# Patient Record
Sex: FEMALE | Race: WHITE
[De-identification: names, ages, dates, MRNs, and addresses within clinical notes are randomized per-mention and may not be internally consistent; named-entity substitution may affect disease eponyms.]

---

## 2017-01-27 NOTE — ER DOCUMENT REPORT
ED GI/





- General


Chief Complaint: Abdominal Pain


Stated Complaint: ABDOMINAL PAIN


Mode of Arrival: Medic


Information source: Patient


Notes: 


Patient is a 21-year-old  female with a history of kidney stones who 

presents to the ER today for right-sided abdominal pain in the upper abdomen 

that woke her up from sleep suddenly tonight.  Patient states that it is severe 

pain and that she has never had this before.  She states it radiates around to 

her right upper back as well but nowhere else.  She admits to some slight 

nausea but no vomiting.  She states this does not feel like her previous kidney 

stones.  She does have her gallbladder and has no history of gallbladder or 

liver issues.


TRAVEL OUTSIDE OF THE [place] IN LAST 30 DAYS: No





- Related Data


Allergies/Adverse Reactions: 


 





No Known Allergies Allergy (Verified 16 17:25)


 











Past Medical History





- General


Information source: Patient





- Social History


Smoking Status: Current Every Day Smoker


Frequency of alcohol use: Occasional


Drug Abuse: None


Family History: Reviewed & Not Pertinent


Renal/ Medical History: Denies: Hx Peritoneal Dialysis


Psychiatric Medical History: Reports: Hx Anxiety, Hx Depression


Past Surgical History: Reports: Hx  Section, Hx Tonsillectomy





- Immunizations


Hx Diphtheria, Pertussis, Tetanus Vaccination: Yes





Review of Systems





- Review of Systems


Constitutional: No symptoms reported


EENT: No symptoms reported


Cardiovascular: No symptoms reported


Respiratory: No symptoms reported


Gastrointestinal: See HPI


Genitourinary: See HPI


Female Genitourinary: No symptoms reported


Musculoskeletal: No symptoms reported


Skin: No symptoms reported


Hematologic/Lymphatic: No symptoms reported


Neurological/Psychological: No symptoms reported





Physical Exam





- Vital signs


Vitals: 





 











Temp Pulse Resp BP Pulse Ox


 


 98.1 F   94   18   119/72   99 


 


 17 00:35  17 00:35  17 00:35  17 00:35  17 00:35














- Notes


Notes: 


PHYSICAL EXAMINATION: 


GENERAL: obviously in pain, crying and can't sit still, in mild acute distress. 


HEAD: Atraumatic, normocephalic. 


EYES: Pupils equal round and reactive to light, extraocular movements intact, 

sclera anicteric, conjunctiva are normal. 


NECK: Normal range of motion, supple without lymphadenopathy 


LUNGS: CTAB and equal. No wheezes rales or rhonchi. 


HEART: Regular rate and rhythm without murmurs


ABDOMEN: Soft,moderate RUQ and right sided tenderness. No guarding, no rebound 


BACK: no vertebral tenderness, normal ROM


GI/: no CVA tenderness


EXTREMITIES: Normal range of motion, no pitting edema. No cyanosis. 


NEUROLOGICAL: Cranial nerves grossly intact. Normal sensory/motor exams. 


PSYCH: Normal mood, normal affect. 


SKIN: Warm, Dry, normal turgor, no rashes or lesions noted 

















Course





- Re-evaluation


Re-evalutation: 





17 03:12


Pt has an elevated WBC at 17.2 and elevated liver enzymes, but RUQ ultrasound 

negative for any acute pathology, normal gallbladder and liver. Pt feels better 

and is sitting up smiling and playing cards with her boyfriend after pain 

medication, IV fluids. hepatitis panel sent, pending. Urinalysis negative for 

blood but does show some evidence of infection with WBC of greater than 10. I 

will treat her for infection with cephalexin and she promises to call GI first 

thing in the morning for follow up. I have provided her with the information. 





- Vital Signs


Vital signs: 





 











Temp Pulse Resp BP Pulse Ox


 


 98.1 F   94   18   119/72   99 


 


 17 00:35  17 00:35  17 00:35  17 00:35  17 00:35














- Laboratory


Result Diagrams: 


 17 00:55





 17 00:55


Laboratory results interpreted by me: 





 











  17





  00:55 00:55 02:20


 


WBC  17.2 H  


 


Absolute Neutrophils  12.4 H  


 


AST   85 H 


 


ALT   75 H 


 


Urine Protein    30 H


 


Urine Ketones    TRACE H


 


Urine Ascorbic Acid    40 H














Discharge





- Discharge


Clinical Impression: 


 RUQ pain, Elevated liver enzymes





UTI (urinary tract infection)


Qualifiers:


 Urinary tract infection type: site unspecified Hematuria presence: without 

hematuria Qualified Code(s): N39.0 - Urinary tract infection, site not specified





Condition: Stable


Disposition: HOME, SELF-CARE


Instructions:  Urinary Tract Infection ([facility]), Cephalexin ([facility]), Gallbladder 

Disease ([facility]), Low-Fat Diet ([facility])


Additional Instructions: 


Return immediately for any new or worsening symptoms.





Follow up with GI, call tomorrow to make followup appointment.





Prescriptions: 


Cephalexin [Cephalexin 500 MG Capsule] 1 cap PO BID #14 cap


Oxycodone HCl 5 mg PO Q4 #15 tablet


Referrals: 


CHRIS GONZALES MD [Primary Care Provider] - Follow up as needed


TIMOTEO VILLAR MD [ACTIVE STAFF] - Follow up as needed

## 2017-02-03 NOTE — ER DOCUMENT REPORT
ED General





- General


Chief Complaint: Abdominal Pain


Stated Complaint: ABDOMINAL PAIN


Mode of Arrival: Medic


Information source: Patient


Notes: 


21-year-old female presents with complaints of right upper quadrant abdominal 

pain nausea vomiting that started all of a sudden today.  Patient denies any 

fevers or chills, patient notes similar symptoms about 2 weeks ago, noted 

elevated liver enzymes but otherwise normal workup.  Patient has follow-up with 

GI next month


TRAVEL OUTSIDE OF THE [place] IN LAST 30 DAYS: No





- HPI


Onset: Just prior to arrival


Onset/Duration: Sudden


Quality of pain: Sharp


Severity: Mild


Pain Level: 2


Associated symptoms: Nausea, Vomiting


Exacerbated by: Denies


Relieved by: Denies


Similar symptoms previously: Yes


Recently seen / treated by doctor: Yes





- Related Data


Allergies/Adverse Reactions: 


 





No Known Allergies Allergy (Verified 16 17:25)


 











Past Medical History





- Social History


Smoking Status: Never Smoker


Cigarette use (# per day): No


Chew tobacco use (# tins/day): No


Smoking Education Provided: No


Family History: Reviewed & Not Pertinent


Renal/ Medical History: Denies: Hx Peritoneal Dialysis


Psychiatric Medical History: Reports: Hx Anxiety, Hx Depression


Past Surgical History: Reports: Hx  Section, Hx Tonsillectomy





- Immunizations


Hx Diphtheria, Pertussis, Tetanus Vaccination: Yes





Review of Systems





- Review of Systems


Notes: 


REVIEW OF SYSTEMS:


CONSTITUTIONAL :  Denies fever,  chills, or sweats.  Denies recent illness.


EENT:   Denies eye, ear, throat, or mouth pain or symptoms.  Denies nasal or 

sinus congestion or discharge.  Denies throat, tongue, or mouth swelling or 

difficulty swallowing.


CARDIOVASCULAR:  Denies chest pain.  Denies palpitations or racing or irregular 

heart beat.  Denies ankle edema.


RESPIRATORY:  Denies cough, cold, or chest congestion.  Denies shortness of 

breath, difficulty breathing, or wheezing.


GASTROINTESTINAL: Abdominal pain nausea vomiting


GENITOURINARY:  Denies difficulty urinating, painful urination, burning, 

frequency, blood in urine, or discharge.


FEMALE  GENITOURINARY:  Denies vaginal bleeding, heavy or abnormal periods, 

irregular periods.  Denies vaginal discharge or odor. 


MUSCULOSKELETAL:  Denies back or neck pain or stiffness.  Denies joint pain or 

swelling.


SKIN:   Denies rash, lesions or sores.


HEMATOLOGIC :   Denies easy bruising or bleeding.


LYMPHATIC:  Denies swollen, enlarged glands.


NEUROLOGICAL:  Denies confusion or altered mental status.  Denies passing out 

or loss of consciousness.  Denies dizziness or lightheadedness.  Denies 

headache.  Denies weakness or paralysis or loss of use of either side.  Denies 

problems with gait or speech.  Denies sensory loss, numbness, or tingling.  

Denies seizures.


PSYCHIATRIC:  Denies anxiety or stress.  Denies depression, suicidal ideation, 

or homicidal ideation.





ALL OTHER SYSTEMS REVIEWED AND NEGATIVE.








Dictation was performed using Dragon voice recognition software 











PHYSICAL EXAMINATION:





GENERAL: Well-appearing, well-nourished and in no acute distress.





HEAD: Atraumatic, normocephalic.





EYES: Pupils equal round and reactive to light, extraocular movements intact, 

conjunctiva are normal.





ENT: Nares patent, oropharynx clear without exudates.  Moist mucous membranes.





NECK: Normal range of motion, supple without lymphadenopathy





LUNGS: Breath sounds clear to auscultation bilaterally and equal.  No wheezes 

rales or rhonchi.





HEART: Regular rate and rhythm without murmurs





ABDOMEN: Soft, mild tenderness right upper quadrant no rebound or guarding





Female : deferred





Musculoskeletal: Normal range of motion, no pitting or edema.  No cyanosis.





NEUROLOGICAL: Cranial nerves grossly intact.  Normal speech, normal gait.  

Normal sensory, motor exams





PSYCH: Normal mood, normal affect.





SKIN: Warm, Dry, normal turgor, no rashes or lesions noted.





Course





- Re-evaluation


Re-evalutation: 


17 10:20


Patient had recent negative ultrasounds, I will evaluate her with a CT lab work





17 14:06


pts lab work notes improvement of WBC count and  liver enzymes [facility] 





17 14:38


CT noted no acute abnormality, i will dc home to follow up with GI





After performing a Medical Screening Examination, I estimate there is LOW risk 

for ACUTE APPENDICITIS, BOWEL OBSTRUCTION, ACUTE CHOLECYSTITIS, PERFORATED 

DIVERTICULITIS, INCARCERATED HERNIA, PANCREATITIS, PELVIC INFLAMMATORY DISEASE, 

PERFORATED ULCER, ECTOPIC PREGNANCY, or TUBO-OVARIAN ABSCESS, thus I consider 

the discharge disposition reasonable. Also, there is no evidence or peritonitis

, sepsis, or toxicity. The patient and I have discussed the diagnosis and risks

, and we agree with discharging home with close follow-up with the 

understanding that symptoms and presentations can change. We also discussed 

returning to the Emergency Department immediately if new or worsening symptoms 

occur. We have discussed the symptoms which are most concerning (e.g., bloody 

stool, fever, changing or worsening pain, vomiting) that necessitate immediate 

return.





- Laboratory


Result Diagrams: 


 17 10:25





 17 10:25


Laboratory results interpreted by me: 


 











  17





  10:25


 


WBC  12.8 H


 


Absolute Monocytes  1.5 H














- Diagnostic Test


Radiology reviewed: Image reviewed, Reports reviewed





Discharge





- Discharge


Clinical Impression: 


Abdominal pain


Qualifiers:


 Abdominal location: generalized Qualified Code(s): R10.84 - Generalized 

abdominal pain





Condition: Stable


Disposition: HOME, SELF-CARE


Instructions:  Abdominal Pain ([facility])


Additional Instructions: 


Please continue follow-up with GI specialist or return immediately if there are 

any other concerns


Prescriptions: 


Famotidine [Pepcid 20 mg Tablet] 20 mg PO DAILY #30 tablet


Hydrocodone/Acetaminophen [Norco 5-325 mg Tablet] 1 tab PO Q6 #10 tablet

## 2017-04-11 ENCOUNTER — HOSPITAL ENCOUNTER (EMERGENCY)
Dept: HOSPITAL 62 - ER | Age: 22
LOS: 1 days | Discharge: HOME | End: 2017-04-12
Payer: MEDICAID

## 2017-04-11 DIAGNOSIS — R10.13: ICD-10-CM

## 2017-04-11 DIAGNOSIS — K29.00: Primary | ICD-10-CM

## 2017-04-11 DIAGNOSIS — F17.200: ICD-10-CM

## 2017-04-11 DIAGNOSIS — R10.11: ICD-10-CM

## 2017-04-11 PROCEDURE — 81025 URINE PREGNANCY TEST: CPT

## 2017-04-11 PROCEDURE — 81001 URINALYSIS AUTO W/SCOPE: CPT

## 2017-04-11 PROCEDURE — 83690 ASSAY OF LIPASE: CPT

## 2017-04-11 PROCEDURE — 99284 EMERGENCY DEPT VISIT MOD MDM: CPT

## 2017-04-11 PROCEDURE — 36415 COLL VENOUS BLD VENIPUNCTURE: CPT

## 2017-04-11 PROCEDURE — 80053 COMPREHEN METABOLIC PANEL: CPT

## 2017-04-11 PROCEDURE — 85025 COMPLETE CBC W/AUTO DIFF WBC: CPT

## 2017-04-12 VITALS — DIASTOLIC BLOOD PRESSURE: 63 MMHG | SYSTOLIC BLOOD PRESSURE: 126 MMHG

## 2017-04-12 LAB
ALBUMIN SERPL-MCNC: 4.4 G/DL (ref 3.5–5)
ALP SERPL-CCNC: 55 U/L (ref 38–126)
ALT SERPL-CCNC: 38 U/L (ref 9–52)
ANION GAP SERPL CALC-SCNC: 11 MMOL/L (ref 5–19)
APPEARANCE UR: (no result)
AST SERPL-CCNC: 18 U/L (ref 14–36)
BASOPHILS # BLD AUTO: 0.1 10^3/UL (ref 0–0.2)
BASOPHILS NFR BLD AUTO: 0.7 % (ref 0–2)
BILIRUB DIRECT SERPL-MCNC: 0.3 MG/DL (ref 0–0.4)
BILIRUB SERPL-MCNC: 0.5 MG/DL (ref 0.2–1.3)
BILIRUB UR QL STRIP: NEGATIVE
BUN SERPL-MCNC: 8 MG/DL (ref 7–20)
CALCIUM: 9.6 MG/DL (ref 8.4–10.2)
CHLORIDE SERPL-SCNC: 106 MMOL/L (ref 98–107)
CO2 SERPL-SCNC: 28 MMOL/L (ref 22–30)
CREAT SERPL-MCNC: 0.74 MG/DL (ref 0.52–1.25)
EOSINOPHIL # BLD AUTO: 0.3 10^3/UL (ref 0–0.6)
EOSINOPHIL NFR BLD AUTO: 3 % (ref 0–6)
ERYTHROCYTE [DISTWIDTH] IN BLOOD BY AUTOMATED COUNT: 13.2 % (ref 11.5–14)
GLUCOSE SERPL-MCNC: 88 MG/DL (ref 75–110)
GLUCOSE UR STRIP-MCNC: NEGATIVE MG/DL
HCT VFR BLD CALC: 37.6 % (ref 36–47)
HGB BLD-MCNC: 12.7 G/DL (ref 12–15.5)
HGB HCT DIFFERENCE: 0.5
KETONES UR STRIP-MCNC: NEGATIVE MG/DL
LIPASE SERPL-CCNC: 115.9 U/L (ref 23–300)
LYMPHOCYTES # BLD AUTO: 3.1 10^3/UL (ref 0.5–4.7)
LYMPHOCYTES NFR BLD AUTO: 32.4 % (ref 13–45)
MCH RBC QN AUTO: 29 PG (ref 27–33.4)
MCHC RBC AUTO-ENTMCNC: 33.8 G/DL (ref 32–36)
MCV RBC AUTO: 86 FL (ref 80–97)
MONOCYTES # BLD AUTO: 1 10^3/UL (ref 0.1–1.4)
MONOCYTES NFR BLD AUTO: 10.1 % (ref 3–13)
NEUTROPHILS # BLD AUTO: 5.1 10^3/UL (ref 1.7–8.2)
NEUTS SEG NFR BLD AUTO: 53.8 % (ref 42–78)
NITRITE UR QL STRIP: NEGATIVE
PH UR STRIP: 6 [PH] (ref 5–9)
POTASSIUM SERPL-SCNC: 4.8 MMOL/L (ref 3.6–5)
PROT SERPL-MCNC: 7.2 G/DL (ref 6.3–8.2)
PROT UR STRIP-MCNC: NEGATIVE MG/DL
RBC # BLD AUTO: 4.38 10^6/UL (ref 3.72–5.28)
SODIUM SERPL-SCNC: 144.7 MMOL/L (ref 137–145)
SP GR UR STRIP: 1.02
UROBILINOGEN UR-MCNC: NEGATIVE MG/DL (ref ?–2)
WBC # BLD AUTO: 9.5 10^3/UL (ref 4–10.5)

## 2017-04-12 NOTE — ER DOCUMENT REPORT
ED General





- General


Chief Complaint: Abdominal Injury


Stated Complaint: upper abd pain


Time seen by provider: 04:49


Mode of Arrival: Ambulatory


Information source: Patient


TRAVEL OUTSIDE OF THE [place] IN LAST 30 DAYS: No





- HPI


Notes: 


Patient is 22-year-old female previous history of gastritis and Nexium use with 

previous upper endoscopy that showed gastritis and reflux presents with report 

of right upper quadrant and midepigastric abdominal pain that she's had 

intermittently for months.  The patient had a right upper quadrant ultrasound 

on 17 which was negative and a CT scan on 2/3/17 which was negative, but 

the patient has had some mild white blood cell count elevations and mild liver 

enzyme elevations in the past.  The patient has been taking ibuprofen with 

regular frequency, and she smokes.  She currently is not taking any acid 

blockers.





She has a follow-up scheduled with GI for evaluation.  Patient denies any chest 

pain or difficulty breathing or fever.  She reports associated nausea 

intermittently but denies any recent vomiting.  No hematemesis or melena.











- Related Data


Allergies/Adverse Reactions: 


 





No Known Allergies Allergy (Verified 16 17:25)


 











Past Medical History





- Social History


Smoking Status: Current Every Day Smoker


Cigarette use (# per day): No


Smoking Education Provided: Yes


Frequency of alcohol use: Occasional


Drug Abuse: None, Other - Prior history of cocaine use, currently denies.


Family History: Reviewed & Not Pertinent


Patient has suicidal ideation: No


Patient has homicidal ideation: No


Renal/ Medical History: Denies: Hx Peritoneal Dialysis


Psychiatric Medical History: Reports: Hx Anxiety, Hx Depression


Past Surgical History: Reports: Hx  Section, Hx Tonsillectomy





- Immunizations


Hx Diphtheria, Pertussis, Tetanus Vaccination: Yes





Review of Systems





- Review of Systems


Notes: 


REVIEW OF SYSTEMS:


CONSTITUTIONAL :  Denies fever,  chills, or sweats.  Denies recent illness.


EENT:   Denies eye, ear, throat, or mouth pain or symptoms.  Denies nasal or 

sinus congestion or discharge.  Denies throat, tongue, or mouth swelling or 

difficulty swallowing.


CARDIOVASCULAR:  Denies chest pain.  Denies palpitations or racing or irregular 

heart beat.  Denies ankle edema.


RESPIRATORY:  Denies cough, cold, or chest congestion.  Denies shortness of 

breath, difficulty breathing, or wheezing.


GASTROINTESTINAL: Denies diarrhea.  Denies blood in vomitus, stools, or per 

rectum.  Denies black, tarry stools.  Denies constipation. 


GENITOURINARY:  Denies difficulty urinating, painful urination, burning, 

frequency, blood in urine, or discharge.


FEMALE  GENITOURINARY:  Denies vaginal bleeding, heavy or abnormal periods, 

irregular periods.  Denies vaginal discharge or odor. 


MUSCULOSKELETAL:  Denies back or neck pain or stiffness.  Denies joint pain or 

swelling.


SKIN:   Denies rash, lesions or sores.


HEMATOLOGIC :   Denies easy bruising or bleeding.


LYMPHATIC:  Denies swollen, enlarged glands.


NEUROLOGICAL:  Denies confusion or altered mental status.  Denies passing out 

or loss of consciousness.  Denies dizziness or lightheadedness.  Denies 

headache.  Denies weakness or paralysis or loss of use of either side.  Denies 

problems with gait or speech.  Denies sensory loss, numbness, or tingling.  

Denies seizures.


PSYCHIATRIC:  Denies anxiety or stress.  Denies depression, suicidal ideation, 

or homicidal ideation.





ALL OTHER SYSTEMS REVIEWED AND NEGATIVE.








Dictation was performed using Dragon voice recognition software 





Physical Exam





- Vital signs


Vitals: 


 











Temp Pulse Resp BP Pulse Ox


 


 98.5 F   76   22 H  142/91 H  99 


 


 17 23:34  17 23:34  17 23:34  17 23:34  17 23:34














- Notes


Notes: 


PHYSICAL EXAMINATION:





GENERAL: Well-appearing, well-nourished and in no acute distress.





HEAD: Atraumatic, normocephalic.





EYES: Pupils equal round and reactive to light, extraocular movements intact, 

conjunctiva are normal.





ENT: Nares patent, oropharynx clear without exudates.  Moist mucous membranes.





NECK: Normal range of motion, supple without lymphadenopathy





LUNGS: Breath sounds clear to auscultation bilaterally and equal.  No wheezes 

rales or rhonchi.





HEART: Regular rate and rhythm without murmurs





ABDOMEN: Soft tender to the midepigastric to right upper quadrant region.  No 

rebound or guarding.  Mildly positive Wayne's, but there is a same discomfort 

in the midepigastric region.





Female : deferred





Musculoskeletal: Normal range of motion, no pitting or edema.  No cyanosis.





NEUROLOGICAL: Cranial nerves grossly intact.  Normal speech, normal gait.  

Normal sensory, motor exams





PSYCH: Normal mood, normal affect.





SKIN: Warm, Dry, normal turgor, no rashes or lesions noted.





Course





- Re-evaluation


Re-evalutation: 





17 05:13


This was given Maalox, Pepcid, tramadol.





No suggestion for hepatitis, pancreatitis, UTI, significant dehydration.  

Findings fit more so with a gastritis.  Patient was counseled about quitting 

smoking and stopping anti-inflammatories and was told she may need a follow-up 

HIDA scan if pain continues.





- Vital Signs


Vital signs: 


 











Temp Pulse Resp BP Pulse Ox


 


 98.8 F   71   16   122/97 H  100 


 


 17 02:22  17 02:22  17 02:22  17 02:22  17 02:22














- Laboratory


Result Diagrams: 


 17 02:34





 17 02:34





Discharge





- Discharge


Clinical Impression: 


Abdominal pain


Qualifiers:


 Abdominal location: epigastric Qualified Code(s): R10.13 - Epigastric pain





Gastritis


Qualifiers:


 Gastritis type: unspecified gastritis Chronicity: acute Gastritis bleeding: 

without bleeding Qualified Code(s): K29.00 - Acute gastritis without bleeding





Condition: Stable


Disposition: HOME, SELF-CARE


Instructions:  Abdominal Pain ([facility]), Antinausea Medication ([facility])


Additional Instructions: 


Gastritis





     You have an inflammation of the stomach called gastritis.  This commonly 

causes upper abdominal pain, nausea, and vomiting.  In severe cases, bleeding 

of the stomach lining can occur.  Gastritis can be caused by bacteria or viruses

, alcohol, or stomach-irritating drugs.


     Begin with sips of clear liquids.  Take increasing amounts of fluid over 

the first 24 hours.  Then start small amounts of bland foods (such as dry toast

, applesauce, mashed potato).  Gradually resume your usual diet.


     You should take antacids every two hours until the pain has subsided.  Acid

-suppressing drugs may be prescribed as well.  Avoid aspirin, caffeine, tobacco

, and alcohol.


     If the abdominal pain worsens, or there is evidence of major bleeding in 

the stomach (such as black, tarry stool, bloody or black vomit, or 

lightheadedness), you should return immediately.  Call the doctor if you aren't 

improved in 24 to 36 hours.





Prescriptions: 


Tramadol HCl 50 mg PO Q4HP PRN #20 tablet


 PRN Reason: 


Ondansetron [Zofran Odt 4 mg Tablet] 1 tab PO Q8HP PRN #10 tab.rapdis


 PRN Reason: For Nausea/Vomiting


Omeprazole 20 mg PO DAILY #60 tablet.dr


Forms:  Return to Work

## 2017-04-23 ENCOUNTER — HOSPITAL ENCOUNTER (EMERGENCY)
Dept: HOSPITAL 62 - ER | Age: 22
Discharge: HOME | End: 2017-04-23
Payer: SELF-PAY

## 2017-04-23 VITALS — DIASTOLIC BLOOD PRESSURE: 73 MMHG | SYSTOLIC BLOOD PRESSURE: 145 MMHG

## 2017-04-23 DIAGNOSIS — F12.10: ICD-10-CM
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
## 2017-04-23 DIAGNOSIS — F14.10: ICD-10-CM

## 2017-04-23 DIAGNOSIS — F11.10: ICD-10-CM

## 2017-04-23 DIAGNOSIS — F31.31: ICD-10-CM

## 2017-04-23 DIAGNOSIS — S51.819A: Primary | ICD-10-CM

## 2017-04-23 LAB
ALBUMIN SERPL-MCNC: 4.5 G/DL (ref 3.5–5)
ALP SERPL-CCNC: 53 U/L (ref 38–126)
ALT SERPL-CCNC: 33 U/L (ref 9–52)
ANION GAP SERPL CALC-SCNC: 12 MMOL/L (ref 5–19)
APPEARANCE UR: (no result)
AST SERPL-CCNC: 20 U/L (ref 14–36)
BARBITURATES UR QL SCN: NEGATIVE
BASOPHILS # BLD AUTO: 0.1 10^3/UL (ref 0–0.2)
BASOPHILS NFR BLD AUTO: 0.7 % (ref 0–2)
BILIRUB DIRECT SERPL-MCNC: 0.2 MG/DL (ref 0–0.4)
BILIRUB SERPL-MCNC: 0.7 MG/DL (ref 0.2–1.3)
BILIRUB UR QL STRIP: NEGATIVE
BUN SERPL-MCNC: 13 MG/DL (ref 7–20)
CALCIUM: 9.4 MG/DL (ref 8.4–10.2)
CHLORIDE SERPL-SCNC: 102 MMOL/L (ref 98–107)
CO2 SERPL-SCNC: 30 MMOL/L (ref 22–30)
CREAT SERPL-MCNC: 0.87 MG/DL (ref 0.52–1.25)
EOSINOPHIL # BLD AUTO: 0.7 10^3/UL (ref 0–0.6)
EOSINOPHIL NFR BLD AUTO: 6.6 % (ref 0–6)
ERYTHROCYTE [DISTWIDTH] IN BLOOD BY AUTOMATED COUNT: 13.1 % (ref 11.5–14)
ETHANOL SERPL-MCNC: < 10 MG/DL
GLUCOSE SERPL-MCNC: 77 MG/DL (ref 75–110)
GLUCOSE UR STRIP-MCNC: NEGATIVE MG/DL
HCT VFR BLD CALC: 36.1 % (ref 36–47)
HGB BLD-MCNC: 12.5 G/DL (ref 12–15.5)
HGB HCT DIFFERENCE: 1.4
KETONES UR STRIP-MCNC: NEGATIVE MG/DL
LYMPHOCYTES # BLD AUTO: 4 10^3/UL (ref 0.5–4.7)
LYMPHOCYTES NFR BLD AUTO: 40.1 % (ref 13–45)
MCH RBC QN AUTO: 29.2 PG (ref 27–33.4)
MCHC RBC AUTO-ENTMCNC: 34.5 G/DL (ref 32–36)
MCV RBC AUTO: 84 FL (ref 80–97)
METHADONE UR QL SCN: NEGATIVE
MONOCYTES # BLD AUTO: 1.2 10^3/UL (ref 0.1–1.4)
MONOCYTES NFR BLD AUTO: 11.6 % (ref 3–13)
NEUTROPHILS # BLD AUTO: 4.1 10^3/UL (ref 1.7–8.2)
NEUTS SEG NFR BLD AUTO: 41 % (ref 42–78)
NITRITE UR QL STRIP: NEGATIVE
PCP UR QL SCN: NEGATIVE
PH UR STRIP: 6 [PH] (ref 5–9)
POTASSIUM SERPL-SCNC: 4.2 MMOL/L (ref 3.6–5)
PROT SERPL-MCNC: 7.1 G/DL (ref 6.3–8.2)
PROT UR STRIP-MCNC: 30 MG/DL
RBC # BLD AUTO: 4.28 10^6/UL (ref 3.72–5.28)
SODIUM SERPL-SCNC: 143.5 MMOL/L (ref 137–145)
SP GR UR STRIP: 1.03
URINE OPIATES LOW: (no result)
UROBILINOGEN UR-MCNC: NEGATIVE MG/DL (ref ?–2)
WBC # BLD AUTO: 10 10^3/UL (ref 4–10.5)

## 2017-04-23 PROCEDURE — 93010 ELECTROCARDIOGRAM REPORT: CPT

## 2017-04-23 PROCEDURE — 80307 DRUG TEST PRSMV CHEM ANLYZR: CPT

## 2017-04-23 PROCEDURE — 81001 URINALYSIS AUTO W/SCOPE: CPT

## 2017-04-23 PROCEDURE — 85025 COMPLETE CBC W/AUTO DIFF WBC: CPT

## 2017-04-23 PROCEDURE — 93005 ELECTROCARDIOGRAM TRACING: CPT

## 2017-04-23 PROCEDURE — 99285 EMERGENCY DEPT VISIT HI MDM: CPT

## 2017-04-23 PROCEDURE — 84703 CHORIONIC GONADOTROPIN ASSAY: CPT

## 2017-04-23 PROCEDURE — 36415 COLL VENOUS BLD VENIPUNCTURE: CPT

## 2017-04-23 PROCEDURE — 80053 COMPREHEN METABOLIC PANEL: CPT

## 2017-04-23 NOTE — ER DOCUMENT REPORT
ED Medical Screen (RME)





- General


Chief Complaint: Psych Problem


Stated Complaint: PSYCH EVAL


Notes: 


Patient is here under IVC for cutting herself.


TRAVEL OUTSIDE OF THE [place] IN LAST 30 DAYS: No





- Related Data


Allergies/Adverse Reactions: 


 





No Known Allergies Allergy (Verified 17 14:43)


 











Past Medical History


Renal/ Medical History: Denies: Hx Peritoneal Dialysis


Psychiatric Medical History: Reports: Hx Anxiety, Hx Depression


Past Surgical History: Reports: Hx  Section, Hx Tonsillectomy





- Immunizations


Hx Diphtheria, Pertussis, Tetanus Vaccination: Yes





Physical Exam





- Vital signs


Vitals: 


 











Temp Pulse Resp BP Pulse Ox


 


 98.1 F   97   18   145/84 H  98 


 


 17 14:40  17 14:40  17 14:40  17 14:40  17 14:40














Course





- Vital Signs


Vital signs: 


 











Temp Pulse Resp BP Pulse Ox


 


 98.1 F   97   18   145/84 H  98 


 


 17 14:40  17 14:40  17 14:40  17 14:40  17 14:40














- Laboratory


Result Diagrams: 


 17 15:03





 17 15:03


Laboratory results interpreted by me: 


 











  17





  15:03 15:03


 


Seg Neutrophils %  41.0 L 


 


Eosinophils %  6.6 H 


 


Absolute Eosinophils  0.7 H 


 


Urine Protein   30 H


 


Ur Leukocyte Esterase   TRACE H

## 2017-04-23 NOTE — ER DOCUMENT REPORT
ED Psych Disorder / Suicide





- General


Mode of Arrival: Ambulatory


Information source: Patient


TRAVEL OUTSIDE OF THE [place] IN LAST 30 DAYS: No





- HPI


Patient complains to provider of: Self injury - cutting to her forearm


Onset: This afternoon


Situational problems related to: Significant other - ex-boyfriend


Injury to: Upper extremity - forearm


Associated symptoms: Other - see notes above





<TREVOR VELEZ - Last Filed: 17 15:45>





<ABERCROMBIE,JOHN F - Last Filed: 17 16:28>





- General


Chief Complaint: Psych Problem


Stated Complaint: PSYCH EVAL


Notes: 


22 year old female with history of self-harm (cutting her forearms) and 

depression presents to the ED via [facility] under IVC after the patient's ex-

boyfriend notified police that the patient was cutting her forearm. Patient 

reports that she has been cutting "for years" and that it is a "coping" 

mechanism. Patient denies any suicidal ideation or any other physical problems. 

Patient reports that she has never overdosed or had any suicidal attempts in 

the past. Patient was crying prior to the examination and states that she will 

be going to detention after the ED because the  found a percocet in her purse. 

Patient is upset and is blaming her ex-boyfriend for being in this situation. 

Patient also admits to drinking all through the night and using cocaine and 

marijuana last night. Patient has had a tetanus shot in the last 5 years. (TREVOR VELEZ)





- Related Data


Allergies/Adverse Reactions: 


 





No Known Allergies Allergy (Verified 17 14:43)


 











Past Medical History





- General


Information source: Patient





- Social History


Smoking Status: Unknown if Ever Smoked


Drug Abuse: Cocaine, Marijuana


Family History: Reviewed & Not Pertinent


Patient has suicidal ideation: No


Patient has homicidal ideation: No





- Medical History


Medical History: Negative


Renal/ Medical History: Denies: Hx Peritoneal Dialysis


Psychiatric Medical History: Reports: Hx Anxiety, Hx Depression


Past Surgical History: Reports: Hx  Section, Hx Tonsillectomy





- Immunizations


Hx Diphtheria, Pertussis, Tetanus Vaccination: Yes





<TREVOR VELEZ - Last Filed: 17 15:45>





Review of Systems





- Review of Systems


Constitutional: No symptoms reported


EENT: No symptoms reported


Cardiovascular: No symptoms reported


Respiratory: No symptoms reported


Gastrointestinal: No symptoms reported


Genitourinary: No symptoms reported


Female Genitourinary: No symptoms reported


Musculoskeletal: No symptoms reported


Skin: No symptoms reported


Hematologic/Lymphatic: No symptoms reported


Neurological/Psychological: No symptoms reported.  denies: Suicidal ideation


-: Yes All other systems reviewed and negative





<TREVOR VELEZ - Last Filed: 17 15:45>





Physical Exam





- General


General appearance: Alert


In distress: None





- HEENT


Head: Normocephalic, Atraumatic


Eyes: Normal


Extraocular movements intact: Yes


Pupils: PERRL





- Respiratory


Respiratory status: No respiratory distress


Breath sounds: Normal





- Cardiovascular


Rhythm: Regular


Heart sounds: Normal auscultation





- Abdominal


Inspection: Normal





- Back


Back: Normal





- Extremities


General upper extremity: Normal ROM.  No: Normal inspection - see forearm exam 

below


General lower extremity: Normal inspection, Normal ROM


Forearm: Abrasion - Multiple vertical superficial abrasions to the forearm.  No

: Normal





- Neurological


Neuro grossly intact: Yes


Cognition: Normal


Orientation: AAOx4


[place] Coma Scale Eye Opening: Spontaneous


Sukh Coma Scale Verbal: Oriented


Sukh Coma Scale Motor: Obeys Commands


Sukh Coma Scale Total: 15


Speech: Normal





- Psychological


Associated symptoms: Psychomotor agitation - increased, Other - pressured speech





- Skin


Skin Temperature: Warm


Skin Moisture: Dry


Skin Color: Normal





<TREVOR VELEZ - Last Filed: 17 15:45>





Course





- Laboratory


Result Diagrams: 


 17 15:03





 17 15:03





<TREVOR VELEZ - Last Filed: 17 15:45>





- Laboratory


Result Diagrams: 


 17 15:03





 17 15:03





- EKG Interpretation by Me


EKG shows normal: Sinus rhythm


Rate: Normal - Heart rate 66, probable left atrial enlargement, early J-point 

takeoff, normal QRS, LVH





<ABERCROMBIE,JOHN F - Last Filed: 17 16:28>





- Vital Signs


Vital signs: 





 











Temp Pulse Resp BP Pulse Ox


 


 97.8 F   68   16   126/55 H  97 


 


 17 16:25  17 16:25  17 16:25  17 16:25  17 16:25














- Laboratory


Laboratory results interpreted by me: 





 











  17





  15:03 15:03 15:03


 


Seg Neutrophils %  41.0 L  


 


Eosinophils %  6.6 H  


 


Absolute Eosinophils  0.7 H  


 


Urine Protein    30 H


 


Ur Leukocyte Esterase    TRACE H


 


Salicylates   < 1.0 L 


 


Acetaminophen   < 10 L 














Scribe Documentation





- Scribe


Written by Scribe:: Becca Baker, 2017 [id number]


acting as scribe for :: [employer]





<TREVOR VELEZ - Last Filed: 17 15:45>

## 2017-04-23 NOTE — ER DOCUMENT REPORT
ED Psych Disorder / Suicide





- General


Mode of Arrival: Ambulatory


Information source: Patient, Friend - exboyfriend/current boyfriend, [facility] Records


TRAVEL OUTSIDE OF THE [place] IN LAST 30 DAYS: No





- HPI


Patient complains to provider of: Self injury - superficial cuts to arm


Onset: Just prior to arrival


Onset was: Sudden


Suicide Risk Factors: Bipolar - no medications, but does have outpatient therapy

, Depressed, Substance abuse - pos for opiates, cocaine, and marijuana


Normal mood: Yes


Associated symptoms: Normal affect, Normal mood, Depressed, Tearful


Similar symptoms previously: Yes - patient reports a lengthy history of self-

injurious cutting


Recently seen / treated by doctor: Yes - patient reports she is followed by a 

psychiatric provider as well as outpat





<RAMESH BARAJAS - Last Filed: 17 17:57>





<ABERCROMBIE,JOHN F - Last Filed: 17 18:38>





- General


Chief Complaint: Psych Problem


Stated Complaint: PSYCH EVAL





- HPI


Notes: 


Patient is a 22-year-old female who presents via [facility] under involuntary 

commitment petitioned after she engaged in self-injurious cutting.  Patient 

adamant she cuts as a coping skill.  Patient denies suicidal ideations and/or 

intent behind cutting.  Patient does have one prior episode here at [facility] 2016 where she presented with similar complaints and etiology.  She was 

referred for outpatient treatment at [facility].  Patient's toxicology 

was positive for opiates, cocaine, and marijuana.  Patient today states work 

out their relationship.  Patient states she previously resided with him but 

when they broke up due to cheating, she went to live with her parents.  Patient 

states she and the boyfriend were up all night drinking, smoking marijuana, and 

doing cocaine.  Patient reports when law enforcement came to get her they went 

through her purse and found a prescription not prescribed to her.  She states 

she thinks she is now facing charges for that.  Patient denies heroin and 

states the opiates is a pill.  Patient reports she does not want to call her 

parents due to ongoing discord.  She states they do not agree with how she 

lives her life in the choices she makes, to include the boyfriend and drugs.  

Patient reports she spoke with her father this morning who informed her he 

placed all of her personal belongings outside of the home and that she was no 

longer able to return.  Patient is quite tearful.  Patient adamant that she is 

not suicidal.  Patient states things were actually improving for her to include 

a new job and recently starting to spend more time with her daughter.  Patient 

reports her daughter has been residing with the father.  Patient states she 

goes to counseling at [facility] and discussed the cutting with her outpatient 

counselor 3 weeks ago.  Discussed with patient the need to abstain from all 

drugs and follow-up with her outpatient therapist as soon as possible in order 

to identify and implement alternative coping skills to replace the cutting





Patient's boyfriend presents bedside and reports he is in agreement to take the 

patient home and in sure she follows up with her provider.  He additionally 

reports he will ensure she has a safe place to stay and monitor her for any 

concerns.  He does report he is concerned over her emotional state.  He states 

they recently discussed the cutting and thinks it "brought it back onto her 

brain."  














Alert, coherent and oriented x4.  Anxious mood with tearful affect.  Patient 

denies SI/HI. Patient denied A/V H; delusions not noted. Thought processes were 

organized. Conversational speech was WNL for prosody.  Intelligence is 

estimated at average.  Attention and focus were fair. Insight, judgment and 

impulse control are fair.   





311 (F32.9) Unspecified Depressive Disorder by history


Patient reports a history of being diagnosed with depression; has been 

prescribed antidepressants; family history of depression.  Due to the ED, there 

is insufficient information to obtain a more specific diagnosis.  





Polysubstance abuse





Recommendations:  Rescind IVC and follow up with outpatient mental health 

provider.  Patient denies suicidal intent behind her self injury.  Patient is 

recommended to follow up with her outpatient therapist to identify and 

implement alternative coping skills to manage stressful situations.  Patient 

additionally encouraged for a substance abuse assessment.  Patient no longer 

meets criteria for involuntary commitment and she denies suicidal/homicidal 

ideations, and further denies suicidal intent behind her self-injurious 

cutting.  I consulted with Dr. Garza in regards to the care and management of 

this patient. (RAMESH BARAJAS)





- Related Data


Allergies/Adverse Reactions: 


 





No Known Allergies Allergy (Verified 17 14:43)


 








Home Medications: 


 Current Home Medications





No Home Medications  17 [History]











Past Medical History





- General


Information source: Patient





- Social History


Smoking Status: Current Every Day Smoker


Frequency of alcohol use: Heavy


Drug Abuse: Cocaine, Marijuana


Family History: Reviewed & Not Pertinent


Patient has suicidal ideation: No


Patient has homicidal ideation: No





- Medical History


Medical History: Negative


Renal/ Medical History: Denies: Hx Peritoneal Dialysis


Psychiatric Medical History: Reports: Hx Anxiety, Hx Depression


Past Surgical History: Reports: Hx  Section, Hx Tonsillectomy





- Immunizations


Hx Diphtheria, Pertussis, Tetanus Vaccination: Yes





<RAMESH BARAJAS - Last Filed: 17 17:57>





Course





- Laboratory


Result Diagrams: 


 17 15:03





 17 15:03





<RAMESH BARAJAS - Last Filed: 17 17:57>





- Laboratory


Result Diagrams: 


 17 15:03





 17 15:03





<ABERCROMBIE,JOHN F - Last Filed: 17 18:38>





- Re-evaluation


Re-evalutation: 





17 18:36


Patient was seen by psychiatry.  She will be discharged for outpatient follow-

up.  Substance abuse recommendations were discussed. (ABERCROMBIE,JOHN F)





- Vital Signs


Vital signs: 


 











Temp Pulse Resp BP Pulse Ox


 


 97.7 F   81   18   145/73 H  100 


 


 17 18:32  17 18:32  17 18:32  17 18:32  17 18:32














- Laboratory


Laboratory results interpreted by me: 


 











  17





  15:03 15:03 15:03


 


Seg Neutrophils %  41.0 L  


 


Eosinophils %  6.6 H  


 


Absolute Eosinophils  0.7 H  


 


Urine Protein    30 H


 


Ur Leukocyte Esterase    TRACE H


 


Salicylates   < 1.0 L 


 


Acetaminophen   < 10 L 














Discharge





<RAMESH BARAJAS - Last Filed: 17 17:57>





<ABERCROMBIE,JOHN F - Last Filed: 17 18:38>





- Discharge


Clinical Impression: 


 Self-inflicted injury, Opiate abuse, episodic, Cocaine abuse, Cannabis abuse





Bipolar disorder with current episode depressed


Qualifiers:


 Current episode severity: mild Qualified Code(s): F31.31 - Bipolar disorder, 

current episode depressed, mild





Condition: Good


Disposition: HOME, SELF-CARE


Additional Instructions: 


Bipolar Disorder





     Bipolar disorder is also called manic-depressive disorder. Depression 

alternates with brain hyperactivity called naomi.  Each phase lasts from 

several days to a few weeks.  We don't know exactly what causes bipolar disorder

, but it's treatable.


     During the "manic phase," you may feel elated and energetic.  You may have 

racing thoughts, rapid speech, increased activity, and grandiose ideas.  During 

this time, you may not realize how poor your judgement is.  Inappropriate 

spending, drug abuse, excessive alcohol use, marriage problems, and 

irresponsible sexual behavior are common during the manic phase.


     During the "depressive phase," you might feel depressed, guilty, worthless

, fatigued, and unable to concentrate.  You might have thoughts of suicide.


     Good treatments are available for bipolar disorder. Lithium is a classic 

drug for bipolar disorder, and is still often useful. If the manic phase is 

very mild, an antidepressant alone can be prescribed.  If the manic phase is 

very severe, an antipsychotic medicine (such as Haldol) may be needed. The 

treatment must be matched to your symptoms, so it's important to work closely 

with your psychiatric care provider.


     Contact your physician, the hospital emergency center, crisis line, or 

your counsellor if you are losing control or having self-destructive thoughts.





Cocaine Abuse





     Cocaine causes many dangerous medical problems.  Problems can occur even 

with "usual" amounts.  Cocaine affects judgement, creating a sense of 

invulnerability.  Cocaine users often make bad decisions that seem "great" at 

the time.  Most cocaine users eventually will be hurt by bad job performance, 

damaged personal relations, crime, and unsafe sexual practices.


     Toxic effects of cocaine can include seizures, hallucinations, delusions, 

high blood pressure, heart damage, or sudden death.  There's always the risk of 

a "bad batch."  But heart attacks, brain hemorrhages, or cardiac arrest can 

occur unpredictably even with "normal" use.


     Injection of cocaine is risky for abscesses, endocarditis (heart infection)

, pneumonia, and AIDS.


     Withdrawal from cocaine often causes anxiety and drug cravings. Some users 

become paranoid and psychotic.


     Many treatment programs are available, but you must make the decision to 

quit.  Medication can be prescribed to control the symptoms of cocaine toxicity 

(beta blockers or benzodiazepines).  Withdrawal symptoms may require 

tranquilizers.Narcotic Abuse





     You have been given a prescription for pain control.  This medication is a 

narcotic.  It's best taken with food, as nausea can result if taken on an empty 

stomach.


     Don't operate machinery or drive within six hours of taking this 

medication.  Do not combine this medicine with alcohol, or with any medication 

which can cause sedation (such as cold tablets or sleeping pills) unless you 

get permission from the physician.


     Narcotics tend to cause constipation.  If possible, drink plenty of fluids 

and eat a diet high in fiber and fruits.





     Please be aware that prescription narcotics also have the potential for 

abuse.  People become addicted to these medications because of the general 

sense of wellbeing that they induce.  This feeling along with a significant 

reduction in tension, anxiety, and aggression provides a stimulating seductive 

quality to these drugs.  Once your pain is under control, we encourage you to 

discard your unused narcotics.








Please follow-up with her outpatient therapist tomorrow to schedule an 

appointment to discuss alternative coping skills to replace your maladaptive 

cutting.  Please refrain from illicit drug use.  Please return if your symptoms 

worsen.


Referrals: 


[facility] [Provider Group] - Follow up as needed


Scribe Attestation: 





17 18:37


I personally performed the services described in the documentation, reviewed 

and edited the documentation which was dictated to the scribe in my presence, 

and it accurately records my words and actions. (ABERCROMBIE,JOHN F)

## 2017-04-23 NOTE — EKG REPORT
SEVERITY:- ABNORMAL ECG -

SINUS RHYTHM

PROBABLE LEFT ATRIAL ABNORMALITY

PROBABLE LEFT VENTRICULAR HYPERTROPHY

ST ELEVATION SUGGESTS PERICARDITIS

:

Confirmed by: Shelley Matute MD 23-Apr-2017 16:47:08

## 2017-06-22 ENCOUNTER — HOSPITAL ENCOUNTER (EMERGENCY)
Dept: HOSPITAL 62 - ER | Age: 22
Discharge: HOME | End: 2017-06-22
Payer: SELF-PAY

## 2017-06-22 VITALS — DIASTOLIC BLOOD PRESSURE: 73 MMHG | SYSTOLIC BLOOD PRESSURE: 137 MMHG

## 2017-06-22 DIAGNOSIS — F17.200: ICD-10-CM

## 2017-06-22 DIAGNOSIS — R10.9: Primary | ICD-10-CM

## 2017-06-22 DIAGNOSIS — Z87.442: ICD-10-CM

## 2017-06-22 DIAGNOSIS — Z87.19: ICD-10-CM

## 2017-06-22 DIAGNOSIS — R25.2: ICD-10-CM

## 2017-06-22 LAB
ALBUMIN SERPL-MCNC: 4.2 G/DL (ref 3.5–5)
ALP SERPL-CCNC: 69 U/L (ref 38–126)
ALT SERPL-CCNC: 30 U/L (ref 9–52)
ANION GAP SERPL CALC-SCNC: 13 MMOL/L (ref 5–19)
APPEARANCE UR: CLEAR
AST SERPL-CCNC: 20 U/L (ref 14–36)
BASOPHILS # BLD AUTO: 0.1 10^3/UL (ref 0–0.2)
BASOPHILS NFR BLD AUTO: 0.7 % (ref 0–2)
BILIRUB DIRECT SERPL-MCNC: 0.3 MG/DL (ref 0–0.4)
BILIRUB SERPL-MCNC: 0.6 MG/DL (ref 0.2–1.3)
BILIRUB UR QL STRIP: NEGATIVE
BUN SERPL-MCNC: 10 MG/DL (ref 7–20)
CALCIUM: 9.6 MG/DL (ref 8.4–10.2)
CHLORIDE SERPL-SCNC: 105 MMOL/L (ref 98–107)
CO2 SERPL-SCNC: 23 MMOL/L (ref 22–30)
CREAT SERPL-MCNC: 0.82 MG/DL (ref 0.52–1.25)
EOSINOPHIL # BLD AUTO: 0.4 10^3/UL (ref 0–0.6)
EOSINOPHIL NFR BLD AUTO: 2.7 % (ref 0–6)
ERYTHROCYTE [DISTWIDTH] IN BLOOD BY AUTOMATED COUNT: 13 % (ref 11.5–14)
GLUCOSE SERPL-MCNC: 94 MG/DL (ref 75–110)
GLUCOSE UR STRIP-MCNC: NEGATIVE MG/DL
HCT VFR BLD CALC: 36.9 % (ref 36–47)
HGB BLD-MCNC: 12.2 G/DL (ref 12–15.5)
HGB HCT DIFFERENCE: -0.3
KETONES UR STRIP-MCNC: NEGATIVE MG/DL
LIPASE SERPL-CCNC: 67.9 U/L (ref 23–300)
LYMPHOCYTES # BLD AUTO: 4.3 10^3/UL (ref 0.5–4.7)
LYMPHOCYTES NFR BLD AUTO: 32.8 % (ref 13–45)
MCH RBC QN AUTO: 27.4 PG (ref 27–33.4)
MCHC RBC AUTO-ENTMCNC: 33.1 G/DL (ref 32–36)
MCV RBC AUTO: 83 FL (ref 80–97)
MONOCYTES # BLD AUTO: 1.5 10^3/UL (ref 0.1–1.4)
MONOCYTES NFR BLD AUTO: 11.8 % (ref 3–13)
NEUTROPHILS # BLD AUTO: 6.8 10^3/UL (ref 1.7–8.2)
NEUTS SEG NFR BLD AUTO: 52 % (ref 42–78)
NITRITE UR QL STRIP: NEGATIVE
PH UR STRIP: 7 [PH] (ref 5–9)
POTASSIUM SERPL-SCNC: 3.6 MMOL/L (ref 3.6–5)
PROT SERPL-MCNC: 7.6 G/DL (ref 6.3–8.2)
PROT UR STRIP-MCNC: NEGATIVE MG/DL
RBC # BLD AUTO: 4.47 10^6/UL (ref 3.72–5.28)
SODIUM SERPL-SCNC: 141.2 MMOL/L (ref 137–145)
SP GR UR STRIP: 1
UROBILINOGEN UR-MCNC: NEGATIVE MG/DL (ref ?–2)
WBC # BLD AUTO: 13 10^3/UL (ref 4–10.5)

## 2017-06-22 PROCEDURE — 36415 COLL VENOUS BLD VENIPUNCTURE: CPT

## 2017-06-22 PROCEDURE — 96374 THER/PROPH/DIAG INJ IV PUSH: CPT

## 2017-06-22 PROCEDURE — 99284 EMERGENCY DEPT VISIT MOD MDM: CPT

## 2017-06-22 PROCEDURE — 81001 URINALYSIS AUTO W/SCOPE: CPT

## 2017-06-22 PROCEDURE — 80053 COMPREHEN METABOLIC PANEL: CPT

## 2017-06-22 PROCEDURE — 83690 ASSAY OF LIPASE: CPT

## 2017-06-22 PROCEDURE — 81025 URINE PREGNANCY TEST: CPT

## 2017-06-22 PROCEDURE — 85025 COMPLETE CBC W/AUTO DIFF WBC: CPT

## 2017-06-22 NOTE — ER DOCUMENT REPORT
Doctor's Note


Notes: 


06/22/17 05:33


I performed a quick triage evaluation of the patient.  Patient is a 22-year-old 

of severe right-sided flank pain and quadrant pain.  She says started tonight.  

She has history of gallstones or kidney stones.  She says the pain came on 

suddenly.  No vomiting.  No diarrhea.  Is not associated with eating.  No 

recent fevers or infections.  No dysuria.  No hematuria.  No other complaints 

at this time.  On exam her pain is not reproducible with palpation.  Order 

Toradol.  I will order CBC and CMP and lipase.  I have ordered a UA and a 

pregnancy test.





06/22/17 05:35

## 2017-06-22 NOTE — ER DOCUMENT REPORT
ED General





<JANET SALEH - Last Filed: 17 06:35>





- General


Mode of Arrival: Ambulatory


Information source: Patient


TRAVEL OUTSIDE OF THE [place] IN LAST 30 DAYS: No





- HPI


Onset: Other - Refer to HPI notes


Onset/Duration: Sudden


Similar symptoms previously: Yes


Recently seen / treated by doctor: No





<FANY LIU - Last Filed: 17 07:09>





- General


Chief Complaint: Flank Pain


Stated Complaint: FLANK PAIN


Time Seen by Provider: 17 06:25


Notes: 


Patient is a 22 year old female presenting to the emergency department for right

-sided abdominal pain and flank pain. Patient states her pain was onset at 04:

30 this morning suddenly. Patient states she has a history of kidney stones and 

gallstones. Patient has been evaluated at this emergency department for the 

same complaints twice this year. Patient had a normal right upper quadrant 

abdominal ultrasound on 17 and normal CT of the abdomen/pelvis on 2/3/17. 

Patient states her back is "cramping" on the left side and she cannot get 

comfortable. Patient states that her abdominal pain is better after she was 

given fentanyl via EMS and Toradol here in the emergency department. Patient 

denies any dysuria, hematuria, fever, vomiting, nausea, or diarrhea. Patient's 

pain is not exacerbated with food. Patient also has a history of cutting 

herself and she has been positive for marijuana and cocaine in the past. 

Patient goes to [facility] and has bipolar disorder, anxiety, and depression. (FANY LIU)





- Related Data


Allergies/Adverse Reactions: 


 





No Known Allergies Allergy (Verified 17 14:43)


 











Past Medical History





- General


Information source: Patient





- Social History


Smoking Status: Current Every Day Smoker


Chew tobacco use (# tins/day): No


Frequency of alcohol use: Social


Drug Abuse: Cocaine, Marijuana


Family History: None


Patient has suicidal ideation: No


Patient has homicidal ideation: No


Renal/ Medical History: Reports: Hx Kidney Stones


GI Medical History: Reports: Hx Gastritis, Hx Gastroesophageal Reflux Disease, 

Hx Endoscopy, Other - gallstones


Psychiatric Medical History: Reports: Hx Anxiety, Hx Bipolar Disorder, Hx 

Depression


Past Surgical History: Reports: Hx  Section, Hx Tonsillectomy





- Immunizations


Hx Diphtheria, Pertussis, Tetanus Vaccination: Yes





<FANY LIU - Last Filed: 17 07:09>





Review of Systems





- Review of Systems


Constitutional: No symptoms reported.  denies: Fever


EENT: No symptoms reported


Cardiovascular: No symptoms reported


Respiratory: No symptoms reported


Gastrointestinal: See HPI, Abdominal pain.  denies: Diarrhea, Vomiting


Genitourinary: See HPI, Flank pain.  denies: Burning, Dysuria, Hematuria


Female Genitourinary: No symptoms reported


Musculoskeletal: No symptoms reported


Skin: No symptoms reported


Hematologic/Lymphatic: No symptoms reported


Neurological/Psychological: No symptoms reported


-: Yes All other systems reviewed and negative





<FANY LIU - Last Filed: 17 07:09>





Physical Exam





- Vital signs


Interpretation: Normal





- General


General appearance: Appears well, Alert


In distress: None





- HEENT


Head: Normocephalic, Atraumatic


Eyes: Normal


Pupils: PERRL


Mucous membranes: Moist





- Respiratory


Respiratory status: No respiratory distress


Chest status: Nontender


Breath sounds: Normal


Chest palpation: Normal





- Cardiovascular


Rhythm: Regular


Heart sounds: Normal auscultation


Murmur: No





- Abdominal


Inspection: Normal


Distension: No distension


Bowel sounds: Normal


Tenderness: Nontender


Organomegaly: No organomegaly





- Back


Back: Normal, Tender - tenderness over the right upper lumbar and paravertebral 

musculature, tenderness also over the lateral flank musculature





- Extremities


General upper extremity: Normal inspection, Normal ROM, Normal strength


General lower extremity: Normal inspection, Normal ROM, Normal strength





- Neurological


Neuro grossly intact: Yes


Cognition: Normal


Orientation: AAOx4


Cunningham Coma Scale Eye Opening: Spontaneous


Sukh Coma Scale Verbal: Oriented


Sukh Coma Scale Motor: Obeys Commands


Sukh Coma Scale Total: 15


Speech: Normal





- Psychological


Associated symptoms: Normal affect, Normal mood





- Skin


Skin Temperature: Warm


Skin Moisture: Dry





<FANY LIU - Last Filed: 17 07:09>





- Vital signs


Vitals: 


 











Temp Pulse Resp BP Pulse Ox


 


 98.2 F   65   22 H  117/90 H  97 


 


 17 05:35  17 05:35  17 05:35  17 05:35  17 05:35














Course





- Laboratory


Result Diagrams: 


 17 05:40





 17 05:40





<JANET SALEH - Last Filed: 17 06:35>





- Laboratory


Result Diagrams: 


 17 05:40





 17 05:40





<FANY LIU - Last Filed: 17 07:09>





- Vital Signs


Vital signs: 


 











Temp Pulse Resp BP Pulse Ox


 


 97.9 F   89   18   137/73 H  100 


 


 17 06:58  17 06:58  17 06:58  17 06:58  17 06:58














- Laboratory


Laboratory results interpreted by me: 


 











  17





  05:40 05:40


 


WBC  13.0 H 


 


Absolute Monocytes  1.5 H 


 


Urine Blood   SMALL H














Discharge





<JANET SALEH - Last Filed: 17 06:35>





<FANY LIU - Last Filed: 17 07:09>





- Discharge


Clinical Impression: 


 Acute right flank pain





Condition: Stable


Disposition: HOME, SELF-CARE


Additional Instructions: 


Flank Pain:





     We weren't able to prove an exact cause for your flank pain. Pain in the 

flank can be caused by a muscle strain or spasm. Sometimes a kidney stone 

causes pain, but can't be found on our tests. Infection in the kidney should be 

evident on a urine test. Early shingles can occasionally cause flank pain, 

without the rash that proves the diagnosis. On rare occasions, disease of the 

pancreas, aorta, spleen, or colon can create pain in the flank.


     At this time, there's no evidence of a dangerous condition, and it seems 

safe for you to be at home. If the pain goes away and does not come back, no 

further testing will be needed. If pain persists, or becomes more severe, we 

may need to repeat some tests or order additional new testing.


     Blood in the urine, urgency to urinate frequently, and pain that radiates 

to the groin can indicate a kidney stone. Fever may mean that the pain is due 

to infection, either of the kidney or the colon (diverticulitis). If your pain 

is early shingles, you should develop an eruption of blisters in the painful 

area within a few days. 


     Call the doctor or return if you have pain that is spreading or becoming 

more severe, pain that does not resolve with time, fever, or any other new 

symptoms.











TAKE TYLENOL AND MOTRIN FOR PAIN.


REST.


USE MOIST HEAT TO THE PAINFUL AREA.


FOLLOW UP WITH YOUR DOCTOR IF NOT IMPROVING.


Referrals: 


CAMILLE BARRIENTOS MD [Primary Care Provider] - Follow up as needed


Scribe Attestation: 





17 06:35


I personally performed the services described in the documentation, reviewed 

and edited the documentation which was dictated to the scribe in my presence, 

and it accurately records my words and actions. (JANET SALEH)





Scribe Documentation





- Scribe


Written by Becca:: Becca Watt 17 6:30


acting as scribe for :: Pan





<FANY LIU - Last Filed: 17 07:09>

## 2017-08-20 ENCOUNTER — HOSPITAL ENCOUNTER (EMERGENCY)
Dept: HOSPITAL 62 - ER | Age: 22
LOS: 1 days | Discharge: HOME | End: 2017-08-21
Payer: SELF-PAY

## 2017-08-20 DIAGNOSIS — R79.89: ICD-10-CM

## 2017-08-20 DIAGNOSIS — R00.0: ICD-10-CM

## 2017-08-20 DIAGNOSIS — F17.200: ICD-10-CM

## 2017-08-20 DIAGNOSIS — G89.18: Primary | ICD-10-CM

## 2017-08-20 DIAGNOSIS — R10.13: ICD-10-CM

## 2017-08-20 DIAGNOSIS — R10.11: ICD-10-CM

## 2017-08-20 DIAGNOSIS — Z90.49: ICD-10-CM

## 2017-08-20 LAB
ALBUMIN SERPL-MCNC: 4.5 G/DL (ref 3.5–5)
ALP SERPL-CCNC: 84 U/L (ref 38–126)
ALT SERPL-CCNC: 188 U/L (ref 9–52)
ANION GAP SERPL CALC-SCNC: 7 MMOL/L (ref 5–19)
APPEARANCE UR: CLEAR
AST SERPL-CCNC: 79 U/L (ref 14–36)
BASOPHILS # BLD AUTO: 0.1 10^3/UL (ref 0–0.2)
BASOPHILS NFR BLD AUTO: 0.5 % (ref 0–2)
BILIRUB DIRECT SERPL-MCNC: 0.4 MG/DL (ref 0–0.4)
BILIRUB SERPL-MCNC: 0.7 MG/DL (ref 0.2–1.3)
BILIRUB UR QL STRIP: NEGATIVE
BUN SERPL-MCNC: 16 MG/DL (ref 7–20)
CALCIUM: 9.6 MG/DL (ref 8.4–10.2)
CHLORIDE SERPL-SCNC: 99 MMOL/L (ref 98–107)
CO2 SERPL-SCNC: 30 MMOL/L (ref 22–30)
CREAT SERPL-MCNC: 0.71 MG/DL (ref 0.52–1.25)
EOSINOPHIL # BLD AUTO: 0.2 10^3/UL (ref 0–0.6)
EOSINOPHIL NFR BLD AUTO: 2 % (ref 0–6)
ERYTHROCYTE [DISTWIDTH] IN BLOOD BY AUTOMATED COUNT: 13.4 % (ref 11.5–14)
GLUCOSE SERPL-MCNC: 86 MG/DL (ref 75–110)
GLUCOSE UR STRIP-MCNC: NEGATIVE MG/DL
HCT VFR BLD CALC: 38.8 % (ref 36–47)
HGB BLD-MCNC: 12.8 G/DL (ref 12–15.5)
HGB HCT DIFFERENCE: -0.4
KETONES UR STRIP-MCNC: NEGATIVE MG/DL
LIPASE SERPL-CCNC: 45.3 U/L (ref 23–300)
LYMPHOCYTES # BLD AUTO: 2.2 10^3/UL (ref 0.5–4.7)
LYMPHOCYTES NFR BLD AUTO: 21.4 % (ref 13–45)
MCH RBC QN AUTO: 27.8 PG (ref 27–33.4)
MCHC RBC AUTO-ENTMCNC: 33.1 G/DL (ref 32–36)
MCV RBC AUTO: 84 FL (ref 80–97)
MONOCYTES # BLD AUTO: 1 10^3/UL (ref 0.1–1.4)
MONOCYTES NFR BLD AUTO: 9.8 % (ref 3–13)
NEUTROPHILS # BLD AUTO: 6.8 10^3/UL (ref 1.7–8.2)
NEUTS SEG NFR BLD AUTO: 66.3 % (ref 42–78)
NITRITE UR QL STRIP: NEGATIVE
PH UR STRIP: 7 [PH] (ref 5–9)
POTASSIUM SERPL-SCNC: 4.4 MMOL/L (ref 3.6–5)
PROT SERPL-MCNC: 8.2 G/DL (ref 6.3–8.2)
PROT UR STRIP-MCNC: NEGATIVE MG/DL
RBC # BLD AUTO: 4.62 10^6/UL (ref 3.72–5.28)
SODIUM SERPL-SCNC: 136.2 MMOL/L (ref 137–145)
SP GR UR STRIP: 1.02
UROBILINOGEN UR-MCNC: NEGATIVE MG/DL (ref ?–2)
WBC # BLD AUTO: 10.3 10^3/UL (ref 4–10.5)

## 2017-08-20 PROCEDURE — 36415 COLL VENOUS BLD VENIPUNCTURE: CPT

## 2017-08-20 PROCEDURE — 99284 EMERGENCY DEPT VISIT MOD MDM: CPT

## 2017-08-20 PROCEDURE — 81001 URINALYSIS AUTO W/SCOPE: CPT

## 2017-08-20 PROCEDURE — 74177 CT ABD & PELVIS W/CONTRAST: CPT

## 2017-08-20 PROCEDURE — 96361 HYDRATE IV INFUSION ADD-ON: CPT

## 2017-08-20 PROCEDURE — 96374 THER/PROPH/DIAG INJ IV PUSH: CPT

## 2017-08-20 PROCEDURE — 85025 COMPLETE CBC W/AUTO DIFF WBC: CPT

## 2017-08-20 PROCEDURE — 83690 ASSAY OF LIPASE: CPT

## 2017-08-20 PROCEDURE — 80053 COMPREHEN METABOLIC PANEL: CPT

## 2017-08-20 NOTE — RADIOLOGY REPORT (SQ)
EXAM DESCRIPTION:  CT ABD/PELVIS WITH IV ONLY



COMPLETED DATE/TIME:  8/20/2017 11:05 pm



REASON FOR STUDY:  ruq pain, post-op



COMPARISON:  None.



TECHNIQUE:  CT scan of the abdomen and pelvis performed using helical scanning technique with dynamic
 intravenous contrast injection.  No oral contrast. Images reviewed with lung, soft tissue, and bone 
windows. Reconstructed coronal and sagittal MPR images reviewed. Delayed images for evaluation of the
 urinary system also acquired. All images stored on PACS.

All CT scanners at this facility use dose modulation, iterative reconstruction, and/or weight based d
osing when appropriate to reduce radiation dose to as low as reasonably achievable (ALARA).

[facility]: Dose Right  [facility]: CareDose    [facility]: Dose Right    [facility]: Teradose 4D    [facility]: Smart Technologies



CONTRAST TYPE AND DOSE:  98 mL Isovue-300



RENAL FUNCTION:  None required. The patient is less than 50 years old.



RADIATION DOSE:  .



LIMITATIONS:  None.



FINDINGS:  LOWER CHEST: No significant findings. No nodules or infiltrates.

LIVER: Normal size. No masses.  No dilated ducts.

SPLEEN: Normal size. No focal lesions.

PANCREAS: No masses. No significant calcifications. No adjacent inflammation or peripancreatic fluid 
collections. Pancreatic duct not dilated.

GALLBLADDER: Status post cholecystectomy

ADRENAL GLANDS: No significant masses or asymmetry.

RIGHT KIDNEY AND URETER: No solid masses.   No significant calcifications.   No hydronephrosis or hyd
roureter.

LEFT KIDNEY AND URETER: No solid masses.   No significant calcifications.   No hydronephrosis or hydr
oureter.

AORTA AND VESSELS: No aneurysm. No dissection. Renal arteries, SMA, celiac without stenosis.

RETROPERITONEUM: No retroperitoneal adenopathy, hemorrhage or masses.

BOWEL AND PERITONEAL CAVITY: No masses or inflammatory changes. No free fluid or peritoneal masses.  
A tiny amount of free air is identified in the perihepatic region presumably related to the recent ga
llbladder surgery.

APPENDIX: Not identified

PELVIS: No mass.  No free fluid. Normal bladder.

ABDOMINAL WALL: No masses. No hernias.

BONES: No significant or acute findings.

OTHER: No other significant finding.



IMPRESSION:  Tiny amount of free air is identified presumably postsurgical in nature.  Status post ch
olecystectomy.  Other findings as noted above



TECHNICAL DOCUMENTATION:  JOB ID:  [id number]

Quality ID # [id number]: Final reports with documentation of one or more dose reduction techniques (e.g., Au
tomated exposure control, adjustment of the mA and/or kV according to patient size, use of iterative 
reconstruction technique)

 2011 [email or web address]- All Rights Reserved

## 2017-08-20 NOTE — ER DOCUMENT REPORT
ED General





- General


Chief Complaint: Upper Abdominal Pain


Stated Complaint: ABDOMINAL PAIN


Time Seen by Provider: 17 20:10


Notes: 





Patient is a 22-year-old female status post cholecystectomy 3 days ago who 

presents with ongoing pain to the right upper quadrant since surgical removal.  

Procedure was done at [facility].  States that she had mild pain at time 

of discharge but this has increased since she was sent home.  Does describe it 

as a constant stabbing, aching pain to the right upper quadrant.  States the 

pain is worsened by movement.  She has been taking Percocet with minimal 

improvement of pain.  States she continues to have regular bowel movements.  

She has not contacted her surgeon regarding today's concerns.  She denies any 

fever, vomiting or diarrhea.  No spreading erythema from the surgical 

incisions.  Denies any dysuria, chest pain, shortness of breath, or hemoptysis.


TRAVEL OUTSIDE OF THE [place] IN LAST 30 DAYS: No





- Related Data


Allergies/Adverse Reactions: 


 





No Known Allergies Allergy (Verified 17 20:41)


 








Home Medications: 


 Current Home Medications





Oxycodone HCl/Acetaminophen [Oxycodone-Acetaminophen 5-325] 1 each PO Q4H PRN  [History]


Polyethylene Glycol 3350 [Miralax Powder 17 gm/Packet] 1 packet PO DAILY  [History]











Past Medical History





- General


Information source: Patient





- Social History


Smoking Status: Current Every Day Smoker


Chew tobacco use (# tins/day): No


Frequency of alcohol use: Rare


Drug Abuse: Marijuana


Lives with: Spouse/Significant other


Family History: Reviewed & Not Pertinent


Renal/ Medical History: Reports: Hx Kidney Stones.  Denies: Hx Peritoneal 

Dialysis


GI Medical History: Reports: Hx Gastritis, Hx Gastroesophageal Reflux Disease, 

Hx Endoscopy


Psychiatric Medical History: Reports: Hx Anxiety, Hx Bipolar Disorder, Hx 

Depression


Past Surgical History: Reports: Hx  Section, Hx Cholecystectomy, Hx 

Tonsillectomy





- Immunizations


Hx Diphtheria, Pertussis, Tetanus Vaccination: Yes





Review of Systems





- Review of Systems


Notes: 





Constitutional: Negative for fever.


HENT: Negative for sore throat.


Eyes: Negative for visual changes.


Cardiovascular: Negative for chest pain.


Respiratory: Negative for shortness of breath.


Gastrointestinal: Positive for abdominal pain, no vomiting or diarrhea


Genitourinary: Negative for dysuria.


Musculoskeletal: Negative for back pain.


Skin: Negative for rash.


Neurological: Negative for headaches, weakness or numbness.





10 point ROS negative except as marked above and in HPI.





Physical Exam





- Vital signs


Vitals: 


 











Temp Pulse Resp BP Pulse Ox


 


 98.5 F   104 H  14   134/85 H  99 


 


 17 19:44  17 19:44  17 19:44  17 19:44  17 19:44











Interpretation: Tachycardic


Notes: 





PHYSICAL EXAMINATION:





GENERAL: Well-appearing, well-nourished and in no acute distress.





HEAD: Atraumatic, normocephalic.





EYES: Pupils equal round and reactive to light, extraocular movements intact, 

sclera anicteric, conjunctiva are normal.





ENT: nares patent, oropharynx clear without exudates.  Moist mucous membranes.





NECK: Normal range of motion, supple without lymphadenopathy





LUNGS: Breath sounds clear to auscultation bilaterally and equal.  No wheezes 

rales or rhonchi.





HEART: Regular rate and rhythm without murmurs





ABDOMEN: Soft, focal tenderness the right upper quadrant with voluntary 

guarding but no rebound or involuntary guarding.  Otherwise no focal abdominal 

tenderness.  Normoactive bowel sounds.  No masses appreciated.





EXTREMITIES: Normal range of motion, no pitting or edema.  No cyanosis.





NEUROLOGICAL: No focal neurological deficits. Moves all extremities 

spontaneously and on command.





PSYCH: Normal mood, normal affect.





SKIN: Warm, Dry, normal turgor, four well-healing laparoscopic surgical 

incisions





Course





- Re-evaluation


Re-evalutation: 





17 22:10


Patient presents with persistent right upper quadrant and epigastric abdominal 

pain after having a cholecystectomy 3 days ago at [facility].  Abdominal 

exam shows tenderness to palpation in the right upper quadrant with voluntary 

guarding but no rebound or involuntary guarding.  Her vitals are within normal 

limits at time of my assessment although she was tachycardic at 104 at time of 

initial presentation.  She is afebrile.  Laboratories show only mild LFT 

elevations without any bilirubin elevation.  No leukocytosis.  She denies any 

dysuria and her urinalysis unremarkable.  Will obtain a CT abdomen pelvis to 

evaluate for possible surgical consultation including intra-abdominal abscess, 

bowel perforation, bowel obstruction or an ileus.


17 01:10


CT scan is unremarkable without any evidence of acute pathology.  Trace amount 

of free air which is likely postsurgical.  Repeat abdominal exam is improved 

after receiving morphine.  I discussed with the patient indications to return 

to emergency department and the need to contact her surgeon for follow-up care 

given her ongoing pain postoperatively.  Her vitals remain within normal 

limits.  At this time I do not suspect an acute life-threatening pathology 

based on her exam, history, vitals, labs and CT scan.  At this time will 

discharge with return precautions and follow-up recommendations.  Verbal 

discharge instructions given a the bedside and opportunity for questions given. 

Medication warnings reviewed. Patient is in agreement with this plan and has 

verbalized understanding of return precautions and the need for primary care 

follow-up in the next 24-72 hours.


17 03:45








- Vital Signs


Vital signs: 


 











Temp Pulse Resp BP Pulse Ox


 


 98.2 F   104 H  10 L  115/57 L  99 


 


 17 01:48  17 19:44  17 01:43  17 01:44  17 01:43














- Laboratory


Result Diagrams: 


 17 20:30





 17 20:30


Laboratory results interpreted by me: 


 











  17





  20:30


 


Sodium  136.2 L


 


AST  79 H


 


ALT  188 H














- Diagnostic Test


Radiology reviewed: Reports reviewed





Discharge





- Discharge


Clinical Impression: 


 Postoperative abdominal pain





Condition: Good


Disposition: HOME, SELF-CARE


Additional Instructions: 


You have been seen in the Emergency Department (ED) for abdominal pain.  Your 

evaluation did not identify a clear cause of your symptoms but was generally 

reassuring.  Your CT scan and labs are normal today.





Please contact your surgeon regarding today's emergency department visit.





Return to the ED if your abdominal pain worsens or fails to improve, you 

develop bloody vomiting, bloody diarrhea, you are unable to tolerate fluids due 

to vomiting, fever greater than 101, or other symptoms that concern you.

## 2017-08-21 VITALS — DIASTOLIC BLOOD PRESSURE: 57 MMHG | SYSTOLIC BLOOD PRESSURE: 115 MMHG

## 2017-12-13 ENCOUNTER — HOSPITAL ENCOUNTER (EMERGENCY)
Dept: HOSPITAL 62 - ER | Age: 22
Discharge: HOME | End: 2017-12-13
Payer: SELF-PAY

## 2017-12-13 VITALS — DIASTOLIC BLOOD PRESSURE: 95 MMHG | SYSTOLIC BLOOD PRESSURE: 128 MMHG

## 2017-12-13 DIAGNOSIS — M79.605: ICD-10-CM

## 2017-12-13 DIAGNOSIS — F17.200: ICD-10-CM

## 2017-12-13 DIAGNOSIS — F14.90: ICD-10-CM

## 2017-12-13 DIAGNOSIS — R59.1: Primary | ICD-10-CM

## 2017-12-13 LAB
ANION GAP SERPL CALC-SCNC: 12 MMOL/L (ref 5–19)
APPEARANCE UR: (no result)
BARBITURATES UR QL SCN: NEGATIVE
BASOPHILS # BLD AUTO: 0.2 10^3/UL (ref 0–0.2)
BASOPHILS NFR BLD AUTO: 1 % (ref 0–2)
BILIRUB UR QL STRIP: NEGATIVE
BUN SERPL-MCNC: 10 MG/DL (ref 7–20)
CALCIUM: 10 MG/DL (ref 8.4–10.2)
CHLORIDE SERPL-SCNC: 102 MMOL/L (ref 98–107)
CO2 SERPL-SCNC: 29 MMOL/L (ref 22–30)
CREAT SERPL-MCNC: 0.67 MG/DL (ref 0.52–1.25)
EOSINOPHIL # BLD AUTO: 0.7 10^3/UL (ref 0–0.6)
EOSINOPHIL NFR BLD AUTO: 4.6 % (ref 0–6)
ERYTHROCYTE [DISTWIDTH] IN BLOOD BY AUTOMATED COUNT: 14.9 % (ref 11.5–14)
GLUCOSE SERPL-MCNC: 61 MG/DL (ref 75–110)
GLUCOSE UR STRIP-MCNC: NEGATIVE MG/DL
HCT VFR BLD CALC: 39.2 % (ref 36–47)
HGB BLD-MCNC: 12.9 G/DL (ref 12–15.5)
HGB HCT DIFFERENCE: -0.5
KETONES UR STRIP-MCNC: NEGATIVE MG/DL
LYMPHOCYTES # BLD AUTO: 3.3 10^3/UL (ref 0.5–4.7)
LYMPHOCYTES NFR BLD AUTO: 22.1 % (ref 13–45)
MCH RBC QN AUTO: 26.7 PG (ref 27–33.4)
MCHC RBC AUTO-ENTMCNC: 32.8 G/DL (ref 32–36)
MCV RBC AUTO: 81 FL (ref 80–97)
METHADONE UR QL SCN: NEGATIVE
MONOCYTES # BLD AUTO: 2.2 10^3/UL (ref 0.1–1.4)
MONOCYTES NFR BLD AUTO: 14.9 % (ref 3–13)
NEUTROPHILS # BLD AUTO: 8.6 10^3/UL (ref 1.7–8.2)
NEUTS SEG NFR BLD AUTO: 57.4 % (ref 42–78)
NITRITE UR QL STRIP: NEGATIVE
PCP UR QL SCN: NEGATIVE
PH UR STRIP: 7 [PH] (ref 5–9)
POTASSIUM SERPL-SCNC: 4.5 MMOL/L (ref 3.6–5)
PROT UR STRIP-MCNC: NEGATIVE MG/DL
RBC # BLD AUTO: 4.82 10^6/UL (ref 3.72–5.28)
SODIUM SERPL-SCNC: 143.4 MMOL/L (ref 137–145)
SP GR UR STRIP: 1.01
URINE OPIATES LOW: NEGATIVE
UROBILINOGEN UR-MCNC: NEGATIVE MG/DL (ref ?–2)
WBC # BLD AUTO: 15 10^3/UL (ref 4–10.5)

## 2017-12-13 PROCEDURE — 81025 URINE PREGNANCY TEST: CPT

## 2017-12-13 PROCEDURE — 80307 DRUG TEST PRSMV CHEM ANLYZR: CPT

## 2017-12-13 PROCEDURE — 85025 COMPLETE CBC W/AUTO DIFF WBC: CPT

## 2017-12-13 PROCEDURE — 76857 US EXAM PELVIC LIMITED: CPT

## 2017-12-13 PROCEDURE — 81001 URINALYSIS AUTO W/SCOPE: CPT

## 2017-12-13 PROCEDURE — 80048 BASIC METABOLIC PNL TOTAL CA: CPT

## 2017-12-13 PROCEDURE — 74177 CT ABD & PELVIS W/CONTRAST: CPT

## 2017-12-13 PROCEDURE — 70491 CT SOFT TISSUE NECK W/DYE: CPT

## 2017-12-13 PROCEDURE — 71260 CT THORAX DX C+: CPT

## 2017-12-13 PROCEDURE — 99284 EMERGENCY DEPT VISIT MOD MDM: CPT

## 2017-12-13 PROCEDURE — 36415 COLL VENOUS BLD VENIPUNCTURE: CPT

## 2017-12-13 NOTE — RADIOLOGY REPORT (SQ)
EXAM DESCRIPTION:  CT CHEST WITH; CT ABD/PELVIS WITH IV   ORAL



COMPLETED DATE/TIME:  12/13/2017 4:45 pm; 12/13/2017 4:44 pm



REASON FOR STUDY:  lymphedema; weight loss, lymphadenopathy  lymphadenopathy



COMPARISON:  CT soft tissue neck same date, ultrasound left inguinal region same date



CONTRAST TYPE AND DOSE:  contrast/concentration: Isovue 370.00 mg/ml; Total Contrast Delivered: 100.0
 ml; Total Saline Delivered: 55.0 ml



RENAL FUNCTION:  None required. The patient is less than 50 years old.



TECHNIQUE:  CT scan of the chest performed using helical scanning technique with dynamic intravenous 
contrast injection.  Images reviewed with lung, soft tissue and bone windows. Reconstructed coronal a
nd sagittal MPR images reviewed.  All images stored on PACS.

CT scan of the abdomen and pelvis performed with intravenous and with oral contrastusing helical scan
isaías technique with dynamic intravenous contrast injection.  Images reviewed with lung, soft tissue a
nd bone windows.  Reconstructed coronal and sagittal MPR images reviewed.  Delayed images for evaluat
ion of the urinary system also acquired and evaluated. All images stored on PACS.

All CT scanners at this facility use dose modulation, iterative reconstruction, and/or weight based d
osing when appropriate to reduce radiation dose to as low as reasonably achievable (ALARA).

[facility]: Dose Right  [facility]: CareDose    [facility]: Dose Right    [facility]: Teradose 4D    [facility]: Smart Technologies



RADIATION DOSE:  CT Rad equipment meets quality standard of care and radiation dose reduction techniq
ues were employed. CTDIvol: 7.2 - 9.1 mGy. DLP: 1338 mGy-cm. .



LIMITATIONS:  None.



FINDINGS:  CHEST:

LUNGS AND PLEURA: No opacities, nodules, masses.  No pneumothorax. No effusions.

HILAR AND MEDIASTINAL STRUCTURES: No identified masses or abnormal nodes.

HEART AND VASCULAR STRUCTURES: No aneurysm or dissection.  No central pulmonary emboli.  No pericardi
al effusion.

HARDWARE: None.

THYROID AND OTHER SOFT TISSUES: No masses.  No adenopathy.

BONES: No significant finding.

OTHER: No other significant finding.

ABDOMEN AND PELVIS:

LIVER: Normal size. No masses.  No dilated ducts.

SPLEEN: Normal size. No focal lesions.

PANCREAS: No masses. No significant calcifications. No adjacent inflammation or peripancreatic fluid 
collections. Pancreatic duct not dilated.

GALLBLADDER: No identified stones by CT criteria. No inflammatory changes to suggest cholecystitis.

ADRENAL GLANDS: No significant masses or asymmetry.

RIGHT KIDNEY AND URETER: No solid masses. No significant calcification. No hydronephrosis or hydroure
ter.

LEFT KIDNEY AND URETER: No solid masses. No significant calcification. No hydronephrosis or hydrouret
er.

AORTA AND VESSELS: No aneurysm. No dissection. Renal arteries, SMA, celiac without stenosis.

RETROPERITONEUM: No retroperitoneal adenopathy, hemorrhage or masses.

BOWEL AND PERITONEAL CAVITY: No masses or inflammatory changes. No free fluid or peritoneal masses.

APPENDIX: Normal.

ABDOMINAL WALL: No masses. No hernias.

BONES: No significant or acute findings.

PELVIS: A 3 x 1.5 cm left inguinal lymph node is present, best shown on coronal image 17 and axial im
age 141.

2 other lymph nodes are seen along the left external iliac chain, 2 x 1 cm axial image 132, 2 x 0.8 c
m in size axial image 135.

Remainder of the pelvis is otherwise unremarkable.  Normal urinary bladder.  No free pelvic fluid.  N
ormal size pelvic female organs.  Normal pelvic bowel loops.



IMPRESSION:  Left inguinal and left external iliac enlarged lymph nodes.

Otherwise unremarkable CT scan of the chest abdomen and pelvis with IV and oral contrast



TECHNICAL DOCUMENTATION:  JOB ID:  [id number]

Quality ID # 436: Final reports with documentation of one or more dose reduction techniques (e.g., Au
tomated exposure control, adjustment of the mA and/or kV according to patient size, use of iterative 
reconstruction technique)

 2011 [employer]- All Rights Reserved

## 2017-12-13 NOTE — RADIOLOGY REPORT (SQ)
EXAM DESCRIPTION:  U/S NON-OB PELVIS LTD W/O DOP



COMPLETED DATE/TIME:  12/13/2017 1:06 pm



REASON FOR STUDY:  left inguinal area, pain, swelling, r/o hernia



COMPARISON:  CT abdomen pelvis 8/20/2017



TECHNIQUE:  Dynamic and static grayscale images acquired of the right inguinal soft tissues recorded 
on PACS. Additional selected color Doppler and spectral images recorded.



LIMITATIONS:  None.



FINDINGS:  Patient presents with a palpable abnormality in the left inguinal soft tissues.

In the area of palpable abnormality, a hypoechoic solid nodule with markedly increased color flow is 
present, measuring 3.3 x 1.8 x 1.4 cm in size.  This is worrisome for an enlarged inflamed lymph node
.

Normal adjacent left common femoral artery and vein.

Report called to GINA Copeland in the emergency room



IMPRESSION:  No evidence of left inguinal hernia by ultrasound.

Palpable abnormality in the left inguinal region correlates with an enlarged hypervascular lymph node
, 3.3 x 1.8 x 1.4 cm in size.  Differential is inflamed or infected lymph node versus lymphoprolifera
tive disease



TECHNICAL DOCUMENTATION:  JOB ID:  [id number]

 [employer]- All Rights Reserved

## 2017-12-13 NOTE — RADIOLOGY REPORT (SQ)
EXAM DESCRIPTION:  CT CHEST WITH; CT ABD/PELVIS WITH IV   ORAL



COMPLETED DATE/TIME:  12/13/2017 4:45 pm; 12/13/2017 4:44 pm



REASON FOR STUDY:  lymphedema; weight loss, lymphadenopathy  lymphadenopathy



COMPARISON:  CT soft tissue neck same date, ultrasound left inguinal region same date



CONTRAST TYPE AND DOSE:  contrast/concentration: Isovue 370.00 mg/ml; Total Contrast Delivered: 100.0
 ml; Total Saline Delivered: 55.0 ml



RENAL FUNCTION:  None required. The patient is less than 50 years old.



TECHNIQUE:  CT scan of the chest performed using helical scanning technique with dynamic intravenous 
contrast injection.  Images reviewed with lung, soft tissue and bone windows. Reconstructed coronal a
nd sagittal MPR images reviewed.  All images stored on PACS.

CT scan of the abdomen and pelvis performed with intravenous and with oral contrastusing helical scan
isaías technique with dynamic intravenous contrast injection.  Images reviewed with lung, soft tissue a
nd bone windows.  Reconstructed coronal and sagittal MPR images reviewed.  Delayed images for evaluat
ion of the urinary system also acquired and evaluated. All images stored on PACS.

All CT scanners at this facility use dose modulation, iterative reconstruction, and/or weight based d
osing when appropriate to reduce radiation dose to as low as reasonably achievable (ALARA).

[facility]: Dose Right  [facility]: CareDose    [facility]: Dose Right    [facility]: Teradose 4D    [facility]: Smart Technologies



RADIATION DOSE:  CT Rad equipment meets quality standard of care and radiation dose reduction techniq
ues were employed. CTDIvol: 7.2 - 9.1 mGy. DLP: 1338 mGy-cm. .



LIMITATIONS:  None.



FINDINGS:  CHEST:

LUNGS AND PLEURA: No opacities, nodules, masses.  No pneumothorax. No effusions.

HILAR AND MEDIASTINAL STRUCTURES: No identified masses or abnormal nodes.

HEART AND VASCULAR STRUCTURES: No aneurysm or dissection.  No central pulmonary emboli.  No pericardi
al effusion.

HARDWARE: None.

THYROID AND OTHER SOFT TISSUES: No masses.  No adenopathy.

BONES: No significant finding.

OTHER: No other significant finding.

ABDOMEN AND PELVIS:

LIVER: Normal size. No masses.  No dilated ducts.

SPLEEN: Normal size. No focal lesions.

PANCREAS: No masses. No significant calcifications. No adjacent inflammation or peripancreatic fluid 
collections. Pancreatic duct not dilated.

GALLBLADDER: No identified stones by CT criteria. No inflammatory changes to suggest cholecystitis.

ADRENAL GLANDS: No significant masses or asymmetry.

RIGHT KIDNEY AND URETER: No solid masses. No significant calcification. No hydronephrosis or hydroure
ter.

LEFT KIDNEY AND URETER: No solid masses. No significant calcification. No hydronephrosis or hydrouret
er.

AORTA AND VESSELS: No aneurysm. No dissection. Renal arteries, SMA, celiac without stenosis.

RETROPERITONEUM: No retroperitoneal adenopathy, hemorrhage or masses.

BOWEL AND PERITONEAL CAVITY: No masses or inflammatory changes. No free fluid or peritoneal masses.

APPENDIX: Normal.

ABDOMINAL WALL: No masses. No hernias.

BONES: No significant or acute findings.

PELVIS: A 3 x 1.5 cm left inguinal lymph node is present, best shown on coronal image 17 and axial im
age 141.

2 other lymph nodes are seen along the left external iliac chain, 2 x 1 cm axial image 132, 2 x 0.8 c
m in size axial image 135.

Remainder of the pelvis is otherwise unremarkable.  Normal urinary bladder.  No free pelvic fluid.  N
ormal size pelvic female organs.  Normal pelvic bowel loops.



IMPRESSION:  Left inguinal and left external iliac enlarged lymph nodes.

Otherwise unremarkable CT scan of the chest abdomen and pelvis with IV and oral contrast



TECHNICAL DOCUMENTATION:  JOB ID:  [id number]

Quality ID # 436: Final reports with documentation of one or more dose reduction techniques (e.g., Au
tomated exposure control, adjustment of the mA and/or kV according to patient size, use of iterative 
reconstruction technique)

 2011 [employer]- All Rights Reserved

## 2017-12-13 NOTE — RADIOLOGY REPORT (SQ)
EXAM DESCRIPTION:  CT SOFT TISSUE NECK WITH



COMPLETED DATE/TIME:  12/13/2017 4:45 pm



REASON FOR STUDY:  lymphedema lymphadenopathy



COMPARISON:  CT chest abdomen pelvis same date

Ultrasound of the left inguinal region



TECHNIQUE:  Post IV contrasted scanning from skull base through lung apices with review of bone, soft
 tissue and lung windows.  Reconstructed coronal and sagittal MPR images reviewed.  All images stored
 on PACS.

All CT scanners at this facility use dose modulation, iterative reconstruction, and/or weight based d
osing when appropriate to reduce radiation dose to as low as reasonably achievable (ALARA).

[facility]: Dose Right  [facility]: CareDose    [facility]: Dose Right    [facility]: Teradose 4D    [facility]: Smart Technologies



CONTRAST TYPE AND DOSE:  100 mL Isovue 370- low osmolar.



RENAL FUNCTION:  None required. The patient is less than 50 years old.



RADIATION DOSE:  9 mGy .



LIMITATIONS:  None.



FINDINGS:  SKULL BASE: Intact.  Inferior brain parenchyma in the field of view unremarkable.

MAJOR SALIVARY GLANDS: No solid or cystic masses.  No inflammatory changes.

LYMPHADENOPATHY: 1.4 x 1 cm right jugulodigastric lymph node axial image 45.  1.2 x 1 cm left jugulod
igastric lymph node axial image 44.  No other enlarged cervical or supraclavicular lymph nodes.

MUCOSAL MASSES OR ASYMMETRY: No mucosal masses or asymmetry.

LARYNX/CORDS: No abnormal findings.

VASCULAR STRUCTURES: The major vessels are patent.

LUNG APICES: Clear.

BONES: Intact.

THYROID: Normal size.  No masses.

PARANASAL SINUSES: Circumferential mucous membrane thickening right sphenoid sinus.

OTHER: No other significant finding.



IMPRESSION:  Mildly enlarged jugulodigastric lymph nodes bilaterally, nonspecific.



TECHNICAL DOCUMENTATION:  JOB ID:  [id number]

Quality ID # 436: Final reports with documentation of one or more dose reduction techniques (e.g., Au
tomated exposure control, adjustment of the mA and/or kV according to patient size, use of iterative 
reconstruction technique)

 2011 [employer]- All Rights Reserved

## 2017-12-13 NOTE — ER DOCUMENT REPORT
ED General





- General


Chief Complaint: Abscess


Stated Complaint: LEG PAIN


Time Seen by Provider: 17 12:39


Notes: 





Patient is a 22-year-old female who has a past medical history significant for 

IV drug use who presents emergency department with a left bump on her pelvic 

area.  She states that this lymph node showed up within the past 24 hours, is 

tender to touch without any overlying redness.  Patient admits to a significant 

weight loss over the past 6 months with a lymph node palpable in her neck and 

now in her groin.  She denies any fevers, chills.  States her last use of 

cocaine was yesterday.  She admits to history of use of Percocet, heroin, 

marijuana as well but not recently.  She states all of her most recent use has 

been in her forearms.





Patient is unemployed and uninsured.


TRAVEL OUTSIDE OF THE [place] IN LAST 30 DAYS: No





- Related Data


Allergies/Adverse Reactions: 


 





No Known Allergies Allergy (Verified 17 11:56)


 








Home Medications: 


 Current Home Medications





No Home Medications  17 [History]











Past Medical History





- Social History


Smoking Status: Current Every Day Smoker


Chew tobacco use (# tins/day): No


Frequency of alcohol use: Heavy


Drug Abuse: Cocaine


Family History: Reviewed & Not Pertinent


Patient has suicidal ideation: No


Patient has homicidal ideation: No


Renal/ Medical History: Reports: Hx Kidney Stones.  Denies: Hx Peritoneal 

Dialysis


GI Medical History: Reports: Hx Gastritis, Hx Gastroesophageal Reflux Disease, 

Hx Endoscopy


Psychiatric Medical History: Reports: Hx Anxiety, Hx Bipolar Disorder, Hx 

Depression


Past Surgical History: Reports: Hx  Section, Hx Cholecystectomy, Hx 

Tonsillectomy





- Immunizations


Hx Diphtheria, Pertussis, Tetanus Vaccination: Yes





Review of Systems





- Review of Systems


Constitutional: No symptoms reported


EENT: No symptoms reported


Cardiovascular: No symptoms reported


Respiratory: No symptoms reported


Gastrointestinal: No symptoms reported


Genitourinary: No symptoms reported


Female Genitourinary: No symptoms reported


Musculoskeletal: See HPI


-: Yes All other systems reviewed and negative





Physical Exam





- Vital signs


Vitals: 


 











Temp Pulse Resp BP Pulse Ox


 


 98.1 F   118 H  14   154/92 H  100 


 


 17 12:00  17 12:00  17 12:00  17 12:00  17 12:00














- Notes


Notes: 


PHYSICAL EXAM


GENERAL: Alert, interacts well. 


HEAD: Normocephalic, atraumatic.


EYES: Pupils equal, round, and reactive to light. Extraocular movements intact.


ENT: Oral mucosa moist, tongue midline. 


NECK: Full range of motion. Supple. Trachea midline.


LUNGS: Clear to auscultation bilaterally, no wheezes, rales, or rhonchi. No 

respiratory distress.


HEART: Regular rate and rhythm. No murmurs, gallops, or rubs.


ABDOMEN: Soft,  nondistended, nontender. No guarding, rebound, or rigidity.. 

Bowel sounds present in all 4 quadrants.


EXTREMITIES: Moves all 4 extremities spontaneously. No edema, radial and 

dorsalis pedis pulses 2/4 bilaterally. No cyanosis. 


NEUROLOGICAL: Alert and oriented x4. Normal speech.


PSYCH: Normal affect, normal mood.


SKIN: Warm, dry, normal turgor.  Enlarged left inguinal lymph node tender to 

touch but is mobile and soft without overlying erythema or induration patient 

also has a left neck lymph node that is soft and mobile and nontender.  

Otherwise lymph exam is negative for any significant findings





Course





- Re-evaluation


Re-evalutation: 





17 17:38


Patient is a 22-year-old female who is hemodynamically stable, no acute 

distress and afebrile.  Ultrasound does show concern for enlarged lymph node 

without evidence of abscess or fluid collection.  Discussion with Dr. Buenrostro 

recommend a CT of the neck, chest, abdomen and pelvis to evaluate for concerns 

for lymphoma given patient's weight loss as well as multiple sites of enlarged 

lymphadenopathy and length of presence of her neck lymph node.  The scan did 

not show any evidence of intrathoracic or abdominal concerns for lymphoma with 

stable labs.  Patient's CBC does show elevation however this could be related 

to patient's continued IV drug use.  At this time recommending patient to follow

-up with surgery for excisional biopsy.  Patient's contact information left for 

 to follow-up this week.





- Vital Signs


Vital signs: 


 











Temp Pulse Resp BP Pulse Ox


 


 97.7 F   96   14   128/95 H  100 


 


 17 17:55  17 17:55  17 12:00  17 17:55  17 17:55














- Laboratory


Result Diagrams: 


 17 14:04





 17 14:04


Laboratory results interpreted by me: 


 











  12/13/17 12/13/17 12/13/17





  14:04 14:04 14:04


 


WBC  15.0 H  


 


MCH  26.7 L  


 


RDW  14.9 H  


 


Monocytes %  14.9 H  


 


Absolute Neutrophils  8.6 H  


 


Absolute Monocytes  2.2 H  


 


Absolute Eosinophils  0.7 H  


 


Glucose   61 L 


 


Ur Leukocyte Esterase    SMALL H


 


Urine Ascorbic Acid    20 H














- Diagnostic Test


Radiology reviewed: Image reviewed, Reports reviewed





Discharge





- Discharge


Clinical Impression: 


 Lymphadenopathy





Condition: Good


Disposition: HOME, SELF-CARE


Instructions:  Lymphadenopathy ([facility])


Additional Instructions: 


-Your presentation today is concerning with enlarged lymph nodes and with your 

associated weight loss.  It is encouraged for you to follow-up with a surgical 

office for a biopsy of 1 of these lymph nodes to rule out for any underlying 

cause of cancer.  Given your IV drug use and blood work, it is likely your body 

is having an inflammatory response to bacteria within the drugs that you use.  

Please stop using IV drugs. 


-Please be sure to follow up with the resources that you are discharged with 

today.


-Our  will be contacting you this week


Referrals: 


PAU PELAYO MD [[facility]] - Follow up tomorrow (Surgery-Call tomorrow!

)


[facility] [Provider Group] - Follow up tomorrow (Contact for support 

to get clean)


[facility] [NO LOCAL MD] - Follow up tomorrow


[facility] [Provider Group] - Follow up tomorrow

## 2018-08-27 ENCOUNTER — HOSPITAL ENCOUNTER (EMERGENCY)
Dept: HOSPITAL 62 - ER | Age: 23
Discharge: HOME | End: 2018-08-27
Payer: SELF-PAY

## 2018-08-27 VITALS — DIASTOLIC BLOOD PRESSURE: 70 MMHG | SYSTOLIC BLOOD PRESSURE: 128 MMHG

## 2018-08-27 DIAGNOSIS — T40.1X1A: Primary | ICD-10-CM

## 2018-08-27 DIAGNOSIS — Z87.442: ICD-10-CM

## 2018-08-27 DIAGNOSIS — Z90.49: ICD-10-CM

## 2018-08-27 DIAGNOSIS — F17.210: ICD-10-CM

## 2018-08-27 DIAGNOSIS — F14.10: ICD-10-CM

## 2018-08-27 DIAGNOSIS — F12.10: ICD-10-CM

## 2018-08-27 DIAGNOSIS — F11.10: ICD-10-CM
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
## 2018-08-27 PROCEDURE — 99284 EMERGENCY DEPT VISIT MOD MDM: CPT

## 2018-08-27 NOTE — ER DOCUMENT REPORT
ED General





- General


Mode of Arrival: Ambulatory


Information source: Patient


TRAVEL OUTSIDE OF THE [place] IN LAST 30 DAYS: No





- General


Stated Complaint: POSSIBLE OVERDOSE


Time Seen by Provider: 18 12:54


Notes: 


Patient is a 23 year old female with depression and a history of substance 

abuse and self harm presents to the emergency department via EMS due to an 

overdose. Patient states that she believes she did a little too much heroin and 

further states she does not remember what happened. She states she has no 

intention of quitting heroin. She states she also uses cocaine and marijuana. 





EMS responded to a call and proceeded to give Narcan upon arrival to scene. 

There was approximately .5 hour drive from patient's location to the emergency 

department. 





Of significance, patient has been seen here multiple time for self harm and 

testing positive for substance abuse.


 (TIMBO LUNDBERG)





- Related Data


Allergies/Adverse Reactions: 


 





No Known Allergies Allergy (Verified 17 11:56)


 











Past Medical History





- General


Information source: Patient, Emergency Med Personnel





- Social History


Smoking Status: Current Every Day Smoker


Cigarette use (# per day): Yes


Chew tobacco use (# tins/day): No


Frequency of alcohol use: Rare


Drug Abuse: Cocaine, Heroin, Marijuana


Family History: Reviewed & Not Pertinent


Renal/ Medical History: Reports: Hx Kidney Stones


GI Medical History: Reports: Hx Gastritis, Hx Gastroesophageal Reflux Disease, 

Hx Endoscopy


Psychiatric Medical History: Reports: Hx Anxiety, Hx Bipolar Disorder, Hx 

Depression


Past Surgical History: Reports: Hx  Section, Hx Cholecystectomy, Hx 

Tonsillectomy





- Immunizations


Hx Diphtheria, Pertussis, Tetanus Vaccination: Yes





Review of Systems





- Review of Systems


Constitutional: See HPI


EENT: No symptoms reported


Cardiovascular: No symptoms reported


Respiratory: No symptoms reported


Gastrointestinal: No symptoms reported


Genitourinary: No symptoms reported


Female Genitourinary: No symptoms reported


Musculoskeletal: No symptoms reported


Skin: No symptoms reported


Hematologic/Lymphatic: No symptoms reported


Neurological/Psychological: No symptoms reported


-: Yes All other systems reviewed and negative





Physical Exam





- Vital signs


Vitals: 


 











Temp Pulse Resp BP Pulse Ox


 


 98.5 F   99   16   135/86 H  99 


 


 18 12:53  18 12:53  18 12:53  18 12:53  18 12:53














- Notes


Notes: 


GENERAL: Alert, interacts well. No acute distress.


HEAD: Normocephalic, atraumatic.


EYES: Pupils equal, round, and reactive to light. Extraocular movements intact.


ENT: Oral mucosa moist, tongue midline.


NECK: Full range of motion. Supple. Trachea midline.


LUNGS: Clear to auscultation bilaterally, no wheezes, rales, or rhonchi. No 

respiratory distress.


HEART: Regular rate and rhythm. No murmurs, gallops, or rubs.


ABDOMEN: Soft, non-tender. Non-distended. Bowel sounds present in all 4 

quadrants.


EXTREMITIES: Moves all 4 extremities spontaneously. Sclerosed veins in the BUE.


NEUROLOGICAL: Alert and oriented x3. Normal speech. 


PSYCH: Normal affect, normal mood.


SKIN: Warm, dry, normal turgor. No rashes or lesions noted.





 (TIMBO LUNDBERG)





- Vital Signs


Vital signs: 


 











Temp Pulse Resp BP Pulse Ox


 


 98.5 F   99   16   135/86 H  99 


 


 18 12:53  18 12:53  18 12:53  18 12:53  18 12:53














Discharge





- Discharge


Clinical Impression: 


Heroin overdose


Qualifiers:


 Encounter type: initial encounter Injury intent: accidental or unintentional 

Qualified Code(s): T40.1X1A - Poisoning by heroin, accidental (unintentional), 

initial encounter





Condition: Stable


Disposition: HOME, SELF-CARE


Additional Instructions: 


Heroin Overdose:





     You injected enough opiates to stop breathing.  You were resuscitated by 

the paramedics with Narcan.  After your evaluation and care, it is felt that 

your overdose is not likely to be harmful or of any significant consequences to 

you and you are being discharged. 


       


     Although your overdose does not seem to be of any danger to you at this 

time, if you develop any unusual or unexpected symptoms after your discharge, 

you should return to the Emergency Department immediately for re-evaluation.





     If you decide that you would like to stop using drugs, then go to [facility] or your primary care provider to ask about treatment programs.





RETURN TO THE EMERGENCY ROOM IF ANY NEW OR WORSENING SYMPTOMS.








Scribe Attestation: 





18 13:04


I personally performed the services described in the documentation, reviewed 

and edited the documentation which was dictated to the scribe in my presence, 

and it accurately records my words and actions. (JANET SALEH)





Scribe Documentation





- Scribe


Written by Becca:: Becca Villanueva, 2018 13:30


acting as scribe for :: Pan

## 2019-02-27 ENCOUNTER — HOSPITAL ENCOUNTER (EMERGENCY)
Dept: HOSPITAL 62 - ER | Age: 24
LOS: 1 days | Discharge: HOME | End: 2019-02-28
Payer: MEDICAID

## 2019-02-27 DIAGNOSIS — F17.200: ICD-10-CM

## 2019-02-27 DIAGNOSIS — L02.214: Primary | ICD-10-CM

## 2019-02-27 PROCEDURE — 99283 EMERGENCY DEPT VISIT LOW MDM: CPT

## 2019-02-28 VITALS — SYSTOLIC BLOOD PRESSURE: 120 MMHG | DIASTOLIC BLOOD PRESSURE: 72 MMHG

## 2019-02-28 NOTE — ER DOCUMENT REPORT
ED General





- General


Chief Complaint: Abscess


Stated Complaint: VAGINAL PROBLEM


Time Seen by Provider: 19 00:11


Primary Care Provider: 


GENARO FREDERICK MD [ACTIVE STAFF] - Follow up as needed


DONI YU MD [ACTIVE STAFF] - Follow up as needed


Mode of Arrival: Ambulatory


Information source: Patient


Notes: 





HISTORY OF PRESENT ILLNESS:





Patient is a 23-year-old female with a past medical history of multiple 

psychiatric diagnoses who presents with pain and swelling in the pelvic area 

near her groin that began 2-3 days ago.





Location: "My groin"


Onset: 2-3 days ago


Provocation: None


Quality: "Throbbing and burning"


Radiation: None


Severity: Mild to moderate


Timing: Constant


LMP: "I am not sure but it was normal"








REVIEW OF SYSTEMS:





CONSTITUTIONAL : Denies fever or chills, no sweats.  Denies recent illness.


EENT:   Denies eye, ear, throat, or mouth pain or symptoms.  Denies nasal or 

sinus congestion.


CARDIOVASCULAR: Denies chest pain.


RESPIRATORY: Denies cough, cold, or chest congestion.  Denies shortness of 

breath, difficulty breathing, or wheezing.


GASTROINTESTINAL: Denies abdominal pain.  Denies nausea, vomiting, or diarrhea. 

Denies constipation.   


GENITOURINARY: Denies difficulty urinating, painful urination, burning, 

frequency, or blood in urine.  Denies vaginal bleeding, abnormal or irregular 

periods.


MUSCULOSKELETAL:  Denies neck or back pain or joint pain or swelling.


SKIN:   Positive for "a lump in the groin."  Denies rash or skin lesions.


HEMATOLOGIC :   Denies easy bruising or bleeding.


LYMPHATIC:  Denies swollen, enlarged glands.


NEUROLOGICAL:  Denies altered mental status or loss of consciousness.  Denies 

headache.  Denies weakness or paralysis or loss of use of either side.  Denies 

problems with gait or speech.  Denies sensory or motor loss.


PSYCHIATRIC:  Denies anxiety or stress or depression.





All other systems reviewed and negative.











PHYSICAL EXAMINATION:





GENERAL: Well-appearing, well-nourished and in no acute distress.


HEAD: Atraumatic, normocephalic. No scalp deformity, depression, or crepitance.


EYES: Pupils are 3 mm and equal/round/reactive to light, extraocular movements 

intact, sclera anicteric, conjunctiva are normal.


ENT: Nares patent bilaterally, oropharynx clear without exudates or palatal 

petechia.  Moist mucous membranes. No tonsil hypertrophy.


NECK: Normal range of motion, supple without lymphadenopathy.


LUNGS: Breath sounds present, equal, and clear to auscultation bilaterally.  No 

wheezes, rales, or rhonchi.


HEART: Regular rate and rhythm without murmurs, rubs, or gallops. 2+ peripheral 

pulses.  Normal capillary refill.


ABDOMEN: Approximate 2.5 cm area of swelling with fluctuance and erythema 

overlying the mons pubis, no active drainage, moderate tenderness.  Soft, 

nontender, nondistended. Normoactive bowel sounds.  No guarding, no rebound.  No

masses appreciated.


BACK: Normal contour, no midline tenderness. Rectal exam deferred.


PELVC: Deferred.


EXTREMITIES: Normal range of motion, no pitting or edema.  No cyanosis.


NEUROLOGICAL: No focal neurological deficits. Moves all extremities 

spontaneously and on command.


PSYCH: Normal mood, normal affect.  No suicidal thoughts/ideations.  No 

homocidal thoughts/ideations.  No hallucinations.


SKIN: Warm, dry, normal turgor, no rashes or lesions noted.











ASSESSMENT AND PLAN:





This patient is a 23-year-old female who presents with small subcutaneous 

abscess in the area above the mons pubis.





1. Will not perform incision and drainage given likely high incidence of 

vascularity in the area.


2. Will give oral Bactrim and will discharge home with return precautions and 

follow-up with surgery to perform incision and drainage.


3. Patient voices both agreeing with and understanding plan.


TRAVEL OUTSIDE OF THE [place] IN LAST 30 DAYS: No





- Related Data


Allergies/Adverse Reactions: 


                                        





No Known Allergies Allergy (Verified 19 22:46)


   











Past Medical History





- General


Information source: Patient





- Social History


Smoking Status: Current Every Day Smoker


Chew tobacco use (# tins/day): No


Frequency of alcohol use: None


Drug Abuse: None


Lives with: Family


Family History: Reviewed & Not Pertinent


Patient has suicidal ideation: No


Patient has homicidal ideation: No





- Past Medical History


Cardiac Medical History: Reports: None


Pulmonary Medical History: Reports: None


EENT Medical History: Reports: None


Neurological Medical History: Reports: None


Endocrine Medical History: Reports: None


Renal/ Medical History: Reports: Hx Kidney Stones.  Denies: Hx Peritoneal 

Dialysis


Malignancy Medical History: Reports: None


GI Medical History: Reports: Hx Gastritis, Hx Gastroesophageal Reflux Disease, 

Hx Endoscopy


Musculoskeletal Medical History: Reports None


Skin Medical History: Reports None


Psychiatric Medical History: Reports: Hx Anxiety, Hx Bipolar Disorder, Hx 

Depression


Traumatic Medical History: Reports: None


Infectious Medical History: Reports: None


Past Surgical History: Reports: Hx  Section, Hx Cholecystectomy, Hx 

Tonsillectomy





- Immunizations


Immunizations up to date: Yes


Hx Diphtheria, Pertussis, Tetanus Vaccination: Yes


History of Influenza Vaccine for 10/2017 - 3/2018 Season: Unknown





Physical Exam





- Vital signs


Vitals: 


                                        











Temp Pulse Resp BP Pulse Ox


 


 99.0 F   84   19   145/79 H  100 


 


 19 22:33  19 22:33  19 22:33  19 22:33  19 22:33














Course





- Vital Signs


Vital signs: 


                                        











Temp Pulse Resp BP Pulse Ox


 


 98.1 F   81   19   120/72   100 


 


 19 02:13  19 02:13  19 02:13  19 02:13  19 02:13














Discharge





- Discharge


Clinical Impression: 


Cutaneous abscess


Qualifiers:


 Site of cutaneous abscess: trunk Site of cutaneous abscess of trunk: groin 

Qualified Code(s): L02.214 - Cutaneous abscess of groin





Condition: Good


Disposition: HOME, SELF-CARE


Instructions:  Abscess ([facility]), Trimethoprim-Sulfa ([facility])


Additional Instructions: 


You have been evaluated in the Emergency Department for pain and swelling in the

groin that is likely an infection called an abscess.  While here, you were given

both pain medications as well as an antibiotic and it is now safe to be 

discharged home.  Please follow-up with a surgeon within the next week to drain 

the abscess if there is no improvement with antibiotics. Return to the Emergency

Department if you experience worsening swelling, increasing pain, high fevers, 

or any other concerning symptoms.


Prescriptions: 


Diclofenac Sodium 75 mg PO BID #30 tablet.


Sulfamethoxazole/Trimethoprim [Bactrim Ds Tablet] 1 each PO BID #14 tablet


Referrals: 


GENARO FREDERICK MD [ACTIVE STAFF] - Follow up as needed


DONI YU MD [ACTIVE STAFF] - Follow up as needed


Print Language: English